# Patient Record
Sex: MALE | Race: WHITE | NOT HISPANIC OR LATINO | Employment: PART TIME | URBAN - METROPOLITAN AREA
[De-identification: names, ages, dates, MRNs, and addresses within clinical notes are randomized per-mention and may not be internally consistent; named-entity substitution may affect disease eponyms.]

---

## 2020-01-29 ENCOUNTER — OFFICE VISIT CONVERTED (OUTPATIENT)
Dept: GASTROENTEROLOGY | Facility: CLINIC | Age: 50
End: 2020-01-29
Attending: INTERNAL MEDICINE

## 2020-07-17 ENCOUNTER — HOSPITAL ENCOUNTER (OUTPATIENT)
Dept: PREADMISSION TESTING | Facility: HOSPITAL | Age: 50
Discharge: HOME | End: 2020-07-17
Attending: INTERNAL MEDICINE

## 2020-07-18 LAB — SARS-COV-2 RNA SPEC QL NAA+PROBE: NOT DETECTED

## 2020-07-23 ENCOUNTER — HOSPITAL ENCOUNTER (OUTPATIENT)
Dept: GASTROENTEROLOGY | Facility: HOSPITAL | Age: 50
Setting detail: HOSPITAL OUTPATIENT SURGERY
Discharge: HOME | End: 2020-07-23
Attending: INTERNAL MEDICINE

## 2020-07-23 LAB — GLUCOSE BLD-MCNC: 103 MG/DL (ref 70–99)

## 2020-09-01 ENCOUNTER — OFFICE VISIT CONVERTED (OUTPATIENT)
Dept: GASTROENTEROLOGY | Facility: CLINIC | Age: 50
End: 2020-09-01
Attending: NURSE PRACTITIONER

## 2021-05-15 VITALS
HEIGHT: 76 IN | HEART RATE: 112 BPM | WEIGHT: 315 LBS | DIASTOLIC BLOOD PRESSURE: 74 MMHG | BODY MASS INDEX: 38.36 KG/M2 | SYSTOLIC BLOOD PRESSURE: 120 MMHG

## 2021-06-13 ENCOUNTER — APPOINTMENT (OUTPATIENT)
Dept: CT IMAGING | Facility: HOSPITAL | Age: 51
End: 2021-06-13

## 2021-06-13 ENCOUNTER — HOSPITAL ENCOUNTER (EMERGENCY)
Facility: HOSPITAL | Age: 51
Discharge: HOME | End: 2021-06-13
Attending: EMERGENCY MEDICINE | Admitting: EMERGENCY MEDICINE

## 2021-06-13 VITALS
OXYGEN SATURATION: 96 % | WEIGHT: 315 LBS | SYSTOLIC BLOOD PRESSURE: 128 MMHG | DIASTOLIC BLOOD PRESSURE: 88 MMHG | RESPIRATION RATE: 18 BRPM | HEART RATE: 65 BPM | HEIGHT: 77 IN | BODY MASS INDEX: 37.19 KG/M2 | TEMPERATURE: 98.3 F

## 2021-06-13 DIAGNOSIS — R42 VERTIGO: ICD-10-CM

## 2021-06-13 DIAGNOSIS — H65.01 NON-RECURRENT ACUTE SEROUS OTITIS MEDIA OF RIGHT EAR: Primary | ICD-10-CM

## 2021-06-13 LAB
ALBUMIN SERPL-MCNC: 4.3 G/DL (ref 3.5–5.2)
ALBUMIN/GLOB SERPL: 1.6 G/DL
ALP SERPL-CCNC: 72 U/L (ref 39–117)
ALT SERPL W P-5'-P-CCNC: 41 U/L (ref 1–41)
ANION GAP SERPL CALCULATED.3IONS-SCNC: 10.4 MMOL/L (ref 5–15)
AST SERPL-CCNC: 28 U/L (ref 1–40)
BASOPHILS # BLD AUTO: 0.06 10*3/MM3 (ref 0–0.2)
BASOPHILS NFR BLD AUTO: 0.7 % (ref 0–1.5)
BILIRUB SERPL-MCNC: 0.4 MG/DL (ref 0–1.2)
BUN SERPL-MCNC: 14 MG/DL (ref 6–20)
BUN/CREAT SERPL: 15.7 (ref 7–25)
CALCIUM SPEC-SCNC: 9.3 MG/DL (ref 8.6–10.5)
CHLORIDE SERPL-SCNC: 105 MMOL/L (ref 98–107)
CO2 SERPL-SCNC: 23.6 MMOL/L (ref 22–29)
CREAT SERPL-MCNC: 0.89 MG/DL (ref 0.76–1.27)
DEPRECATED RDW RBC AUTO: 40 FL (ref 37–54)
EOSINOPHIL # BLD AUTO: 0.12 10*3/MM3 (ref 0–0.4)
EOSINOPHIL NFR BLD AUTO: 1.5 % (ref 0.3–6.2)
ERYTHROCYTE [DISTWIDTH] IN BLOOD BY AUTOMATED COUNT: 13.1 % (ref 12.3–15.4)
GFR SERPL CREATININE-BSD FRML MDRD: 90 ML/MIN/1.73
GLOBULIN UR ELPH-MCNC: 2.7 GM/DL
GLUCOSE SERPL-MCNC: 90 MG/DL (ref 65–99)
HCT VFR BLD AUTO: 41.7 % (ref 37.5–51)
HGB BLD-MCNC: 14.3 G/DL (ref 13–17.7)
IMM GRANULOCYTES # BLD AUTO: 0.03 10*3/MM3 (ref 0–0.05)
IMM GRANULOCYTES NFR BLD AUTO: 0.4 % (ref 0–0.5)
LYMPHOCYTES # BLD AUTO: 2.81 10*3/MM3 (ref 0.7–3.1)
LYMPHOCYTES NFR BLD AUTO: 34.6 % (ref 19.6–45.3)
MCH RBC QN AUTO: 29.2 PG (ref 26.6–33)
MCHC RBC AUTO-ENTMCNC: 34.3 G/DL (ref 31.5–35.7)
MCV RBC AUTO: 85.3 FL (ref 79–97)
MONOCYTES # BLD AUTO: 0.49 10*3/MM3 (ref 0.1–0.9)
MONOCYTES NFR BLD AUTO: 6 % (ref 5–12)
NEUTROPHILS NFR BLD AUTO: 4.6 10*3/MM3 (ref 1.7–7)
NEUTROPHILS NFR BLD AUTO: 56.8 % (ref 42.7–76)
NRBC BLD AUTO-RTO: 0 /100 WBC (ref 0–0.2)
PLATELET # BLD AUTO: 323 10*3/MM3 (ref 140–450)
PMV BLD AUTO: 9 FL (ref 6–12)
POTASSIUM SERPL-SCNC: 3.5 MMOL/L (ref 3.5–5.2)
PROT SERPL-MCNC: 7 G/DL (ref 6–8.5)
RBC # BLD AUTO: 4.89 10*6/MM3 (ref 4.14–5.8)
SODIUM SERPL-SCNC: 139 MMOL/L (ref 136–145)
WBC # BLD AUTO: 8.11 10*3/MM3 (ref 3.4–10.8)

## 2021-06-13 PROCEDURE — 80053 COMPREHEN METABOLIC PANEL: CPT | Performed by: EMERGENCY MEDICINE

## 2021-06-13 PROCEDURE — 99283 EMERGENCY DEPT VISIT LOW MDM: CPT

## 2021-06-13 PROCEDURE — 85025 COMPLETE CBC W/AUTO DIFF WBC: CPT | Performed by: EMERGENCY MEDICINE

## 2021-06-13 PROCEDURE — 70450 CT HEAD/BRAIN W/O DYE: CPT

## 2021-06-13 RX ORDER — TAMSULOSIN HYDROCHLORIDE 0.4 MG/1
1 CAPSULE ORAL DAILY
COMMUNITY

## 2021-06-13 RX ORDER — HYDROCHLOROTHIAZIDE 12.5 MG/1
1 TABLET ORAL DAILY
COMMUNITY

## 2021-06-13 RX ORDER — LISINOPRIL 20 MG/1
1 TABLET ORAL DAILY
COMMUNITY

## 2021-06-13 RX ORDER — ASPIRIN 81 MG/1
1 TABLET ORAL DAILY
COMMUNITY

## 2021-06-13 RX ORDER — MECLIZINE HYDROCHLORIDE 25 MG/1
25 TABLET ORAL 3 TIMES DAILY PRN
Qty: 15 TABLET | Refills: 0 | Status: SHIPPED | OUTPATIENT
Start: 2021-06-13

## 2021-06-13 RX ORDER — LEVOFLOXACIN 500 MG/1
500 TABLET, FILM COATED ORAL DAILY
Qty: 7 TABLET | Refills: 0 | Status: SHIPPED | OUTPATIENT
Start: 2021-06-13 | End: 2021-06-26

## 2021-06-13 RX ORDER — ATORVASTATIN CALCIUM 20 MG/1
20 TABLET, FILM COATED ORAL DAILY
COMMUNITY

## 2021-06-13 NOTE — ED PROVIDER NOTES
Subjective   Patient is a 51-yo male who presents to the ED with c/o upper respiratory infection that started 2 weeks ago. Pt went to the Urgent Care in [place] and was put on antibiotics which he has finished. Pt notes he had 3 near-syncopal episodes yesterday.Pt has had dizziness last night and again this morning. Pt symptoms are worse at night and consists of ear pain, sore throat, facial pain, and congestion.     PCP: Matthew Allen ([place])      History provided by:  Patient   used: No        Review of Systems   Constitutional: Negative for chills and fever.   HENT: Positive for congestion, ear pain and sore throat. Negative for nosebleeds.    Eyes: Negative for redness.   Respiratory: Negative for cough and shortness of breath.    Cardiovascular: Negative for chest pain.   Gastrointestinal: Negative for diarrhea and vomiting.   Genitourinary: Negative for dysuria and frequency.   Musculoskeletal: Negative for back pain and neck pain.   Skin: Negative for rash.   Neurological: Positive for dizziness. Negative for seizures.       Past Medical History:   Diagnosis Date   • Diabetes mellitus (CMS/HCC)    • Hypertension        Allergies   Allergen Reactions   • Morphine Shortness Of Breath   • Penicillins Hives       Past Surgical History:   Procedure Laterality Date   • APPENDECTOMY     • BACK SURGERY     • VASECTOMY         Family History   Problem Relation Age of Onset   • Diabetes Mother    • Hypertension Mother    • Hyperlipidemia Mother    • Diabetes Father    • Hypertension Father    • Hyperlipidemia Father    • Diabetes Brother    • Stroke Brother    • Hypertension Brother    • Hyperlipidemia Brother        Social History     Socioeconomic History   • Marital status:      Spouse name: Not on file   • Number of children: Not on file   • Years of education: Not on file   • Highest education level: Not on file   Tobacco Use   • Smoking status: Never Smoker   • Smokeless  tobacco: Current User   Substance and Sexual Activity   • Alcohol use: Never   • Drug use: Never         Objective   Physical Exam  Vitals and nursing note reviewed.   Constitutional:       General: He is not in acute distress.  HENT:      Head: Normocephalic and atraumatic.      Ears:      Comments: Serous fluid behind right TM.      Nose: Nose normal.      Mouth/Throat:      Mouth: Mucous membranes are moist.   Eyes:      General: No scleral icterus.     Pupils: Pupils are equal, round, and reactive to light.   Cardiovascular:      Rate and Rhythm: Normal rate and regular rhythm.      Heart sounds: Normal heart sounds. No murmur heard.     Pulmonary:      Effort: Pulmonary effort is normal. No respiratory distress.      Breath sounds: Normal breath sounds and air entry. No decreased air movement.   Abdominal:      Palpations: Abdomen is soft.      Tenderness: There is no abdominal tenderness. There is no guarding or rebound.      Hernia: No hernia is present.   Musculoskeletal:         General: No tenderness. Normal range of motion.      Cervical back: Normal range of motion and neck supple. No tenderness.      Right lower leg: No edema.      Left lower leg: No edema.   Skin:     General: Skin is warm and dry.   Neurological:      Mental Status: He is alert and oriented to person, place, and time. Mental status is at baseline.      Cranial Nerves: Cranial nerves are intact.      Sensory: Sensation is intact. No sensory deficit.      Motor: Motor function is intact. No weakness.   Psychiatric:         Behavior: Behavior normal.         Procedures         ED Course                                            MDM  Number of Diagnoses or Management Options  Non-recurrent acute serous otitis media of right ear  Vertigo  Diagnosis management comments: The patient is currently asymptomatic in the emergency department.  The patient historically had an episode of vertigo that is been associate with his right serous otitis.   We will change antibiotics and start him on meclizine as needed he is to follow with his primary care doctor.       Amount and/or Complexity of Data Reviewed  Clinical lab tests: reviewed  Tests in the radiology section of CPT®: reviewed        Final diagnoses:   Non-recurrent acute serous otitis media of right ear   Vertigo       Documentation assistance provided by emma Bang.  Information recorded by the scribe was done at my direction and has been verified and validated by me.     Kenyetta Bang  06/13/21 1446              Liam Lopez DO  06/13/21 8091

## 2021-06-13 NOTE — DISCHARGE INSTRUCTIONS
Drink plenty of fluids.  Use Flonase nasal spray as directed.  Take medications as directed.  Follow-up with your doctor in 2 days if not better.

## 2021-07-20 ENCOUNTER — TRANSCRIBE ORDERS (OUTPATIENT)
Dept: ADMINISTRATIVE | Facility: HOSPITAL | Age: 51
End: 2021-07-20

## 2021-07-20 ENCOUNTER — LAB (OUTPATIENT)
Dept: LAB | Facility: HOSPITAL | Age: 51
End: 2021-07-20

## 2021-07-20 DIAGNOSIS — E13.9 DIABETES 1.5, MANAGED AS TYPE 1 (HCC): ICD-10-CM

## 2021-07-20 DIAGNOSIS — E13.9 DIABETES 1.5, MANAGED AS TYPE 1 (HCC): Primary | ICD-10-CM

## 2021-07-20 LAB — HBA1C MFR BLD: 5.92 % (ref 4.8–5.6)

## 2021-07-20 PROCEDURE — 83036 HEMOGLOBIN GLYCOSYLATED A1C: CPT

## 2021-07-20 PROCEDURE — 36415 COLL VENOUS BLD VENIPUNCTURE: CPT

## 2021-11-04 ENCOUNTER — TRANSCRIBE ORDERS (OUTPATIENT)
Dept: ADMINISTRATIVE | Facility: HOSPITAL | Age: 51
End: 2021-11-04

## 2021-11-04 DIAGNOSIS — I49.9 VENTRICULAR ARRHYTHMIA: Primary | ICD-10-CM

## 2021-11-11 ENCOUNTER — HOSPITAL ENCOUNTER (OUTPATIENT)
Dept: CARDIOLOGY | Facility: HOSPITAL | Age: 51
Discharge: HOME | End: 2021-11-11
Admitting: FAMILY MEDICINE

## 2021-11-11 DIAGNOSIS — I49.9 VENTRICULAR ARRHYTHMIA: ICD-10-CM

## 2021-11-11 PROCEDURE — 93225 XTRNL ECG REC<48 HRS REC: CPT

## 2021-11-19 LAB
MAXIMAL PREDICTED HEART RATE: 169 BPM
STRESS TARGET HR: 144 BPM

## 2021-11-19 PROCEDURE — 93227 XTRNL ECG REC<48 HR R&I: CPT | Performed by: INTERNAL MEDICINE

## 2022-04-27 ENCOUNTER — HOSPITAL ENCOUNTER (OUTPATIENT)
Dept: GENERAL RADIOLOGY | Facility: HOSPITAL | Age: 52
Discharge: HOME | End: 2022-04-27
Admitting: FAMILY MEDICINE

## 2022-04-27 ENCOUNTER — TRANSCRIBE ORDERS (OUTPATIENT)
Dept: GENERAL RADIOLOGY | Facility: HOSPITAL | Age: 52
End: 2022-04-27

## 2022-04-27 DIAGNOSIS — M79.642 LEFT HAND PAIN: Primary | ICD-10-CM

## 2022-04-27 DIAGNOSIS — M79.642 LEFT HAND PAIN: ICD-10-CM

## 2022-04-27 PROCEDURE — 73130 X-RAY EXAM OF HAND: CPT

## 2022-07-27 ENCOUNTER — HOSPITAL ENCOUNTER (EMERGENCY)
Facility: HOSPITAL | Age: 52
Discharge: HOME | End: 2022-07-27
Attending: EMERGENCY MEDICINE | Admitting: EMERGENCY MEDICINE

## 2022-07-27 ENCOUNTER — APPOINTMENT (OUTPATIENT)
Dept: CT IMAGING | Facility: HOSPITAL | Age: 52
End: 2022-07-27

## 2022-07-27 VITALS
WEIGHT: 290.35 LBS | OXYGEN SATURATION: 93 % | BODY MASS INDEX: 34.28 KG/M2 | HEART RATE: 76 BPM | DIASTOLIC BLOOD PRESSURE: 55 MMHG | HEIGHT: 77 IN | RESPIRATION RATE: 19 BRPM | SYSTOLIC BLOOD PRESSURE: 95 MMHG | TEMPERATURE: 98.4 F

## 2022-07-27 DIAGNOSIS — R10.84 ACUTE GENERALIZED ABDOMINAL PAIN: ICD-10-CM

## 2022-07-27 DIAGNOSIS — R19.7 NAUSEA, VOMITING, AND DIARRHEA: ICD-10-CM

## 2022-07-27 DIAGNOSIS — E86.0 DEHYDRATION: ICD-10-CM

## 2022-07-27 DIAGNOSIS — E87.6 HYPOKALEMIA: ICD-10-CM

## 2022-07-27 DIAGNOSIS — R11.2 NAUSEA, VOMITING, AND DIARRHEA: ICD-10-CM

## 2022-07-27 DIAGNOSIS — A08.4 VIRAL GASTROENTERITIS: Primary | ICD-10-CM

## 2022-07-27 LAB
ALBUMIN SERPL-MCNC: 3.8 G/DL (ref 3.5–5.2)
ALBUMIN/GLOB SERPL: 1.1 G/DL
ALP SERPL-CCNC: 64 U/L (ref 39–117)
ALT SERPL W P-5'-P-CCNC: 20 U/L (ref 1–41)
AMORPH URATE CRY URNS QL MICRO: ABNORMAL /HPF
ANION GAP SERPL CALCULATED.3IONS-SCNC: 12.9 MMOL/L (ref 5–15)
AST SERPL-CCNC: 15 U/L (ref 1–40)
BACTERIA UR QL AUTO: ABNORMAL /HPF
BASOPHILS # BLD AUTO: 0.08 10*3/MM3 (ref 0–0.2)
BASOPHILS NFR BLD AUTO: 1.1 % (ref 0–1.5)
BILIRUB SERPL-MCNC: 0.5 MG/DL (ref 0–1.2)
BILIRUB UR QL STRIP: ABNORMAL
BUN SERPL-MCNC: 18 MG/DL (ref 6–20)
BUN/CREAT SERPL: 15.3 (ref 7–25)
CALCIUM SPEC-SCNC: 9.4 MG/DL (ref 8.6–10.5)
CHLORIDE SERPL-SCNC: 101 MMOL/L (ref 98–107)
CLARITY UR: CLEAR
CO2 SERPL-SCNC: 23.1 MMOL/L (ref 22–29)
COLOR UR: ABNORMAL
CREAT SERPL-MCNC: 1.18 MG/DL (ref 0.76–1.27)
D-LACTATE SERPL-SCNC: 1.9 MMOL/L (ref 0.5–2)
DEPRECATED RDW RBC AUTO: 39.9 FL (ref 37–54)
EGFRCR SERPLBLD CKD-EPI 2021: 74.2 ML/MIN/1.73
EOSINOPHIL # BLD AUTO: 0.04 10*3/MM3 (ref 0–0.4)
EOSINOPHIL NFR BLD AUTO: 0.5 % (ref 0.3–6.2)
ERYTHROCYTE [DISTWIDTH] IN BLOOD BY AUTOMATED COUNT: 12.7 % (ref 12.3–15.4)
GLOBULIN UR ELPH-MCNC: 3.5 GM/DL
GLUCOSE SERPL-MCNC: 146 MG/DL (ref 65–99)
GLUCOSE UR STRIP-MCNC: NEGATIVE MG/DL
GRAN CASTS URNS QL MICRO: ABNORMAL /LPF
HCT VFR BLD AUTO: 43.3 % (ref 37.5–51)
HGB BLD-MCNC: 14.7 G/DL (ref 13–17.7)
HGB UR QL STRIP.AUTO: NEGATIVE
HOLD SPECIMEN: NORMAL
HOLD SPECIMEN: NORMAL
HYALINE CASTS UR QL AUTO: ABNORMAL /LPF
IMM GRANULOCYTES # BLD AUTO: 0.03 10*3/MM3 (ref 0–0.05)
IMM GRANULOCYTES NFR BLD AUTO: 0.4 % (ref 0–0.5)
KETONES UR QL STRIP: ABNORMAL
LEUKOCYTE ESTERASE UR QL STRIP.AUTO: NEGATIVE
LIPASE SERPL-CCNC: 52 U/L (ref 13–60)
LYMPHOCYTES # BLD AUTO: 1.77 10*3/MM3 (ref 0.7–3.1)
LYMPHOCYTES NFR BLD AUTO: 24.1 % (ref 19.6–45.3)
MAGNESIUM SERPL-MCNC: 2 MG/DL (ref 1.6–2.6)
MCH RBC QN AUTO: 29.3 PG (ref 26.6–33)
MCHC RBC AUTO-ENTMCNC: 33.9 G/DL (ref 31.5–35.7)
MCV RBC AUTO: 86.3 FL (ref 79–97)
MONOCYTES # BLD AUTO: 0.93 10*3/MM3 (ref 0.1–0.9)
MONOCYTES NFR BLD AUTO: 12.7 % (ref 5–12)
NEUTROPHILS NFR BLD AUTO: 4.49 10*3/MM3 (ref 1.7–7)
NEUTROPHILS NFR BLD AUTO: 61.2 % (ref 42.7–76)
NITRITE UR QL STRIP: POSITIVE
NRBC BLD AUTO-RTO: 0 /100 WBC (ref 0–0.2)
PH UR STRIP.AUTO: 5.5 [PH] (ref 5–8)
PLATELET # BLD AUTO: 284 10*3/MM3 (ref 140–450)
PMV BLD AUTO: 9.3 FL (ref 6–12)
POTASSIUM SERPL-SCNC: 3.4 MMOL/L (ref 3.5–5.2)
PROT SERPL-MCNC: 7.3 G/DL (ref 6–8.5)
PROT UR QL STRIP: ABNORMAL
RBC # BLD AUTO: 5.02 10*6/MM3 (ref 4.14–5.8)
RBC # UR STRIP: ABNORMAL /HPF
REF LAB TEST METHOD: ABNORMAL
SODIUM SERPL-SCNC: 137 MMOL/L (ref 136–145)
SP GR UR STRIP: >=1.03 (ref 1–1.03)
SQUAMOUS #/AREA URNS HPF: ABNORMAL /HPF
UROBILINOGEN UR QL STRIP: ABNORMAL
WBC # UR STRIP: ABNORMAL /HPF
WBC NRBC COR # BLD: 7.34 10*3/MM3 (ref 3.4–10.8)
WHOLE BLOOD HOLD COAG: NORMAL
WHOLE BLOOD HOLD SPECIMEN: NORMAL

## 2022-07-27 PROCEDURE — 36415 COLL VENOUS BLD VENIPUNCTURE: CPT

## 2022-07-27 PROCEDURE — 83690 ASSAY OF LIPASE: CPT

## 2022-07-27 PROCEDURE — 25010000002 DICYCLOMINE PER 20 MG: Performed by: EMERGENCY MEDICINE

## 2022-07-27 PROCEDURE — 85025 COMPLETE CBC W/AUTO DIFF WBC: CPT

## 2022-07-27 PROCEDURE — 96375 TX/PRO/DX INJ NEW DRUG ADDON: CPT

## 2022-07-27 PROCEDURE — 96365 THER/PROPH/DIAG IV INF INIT: CPT

## 2022-07-27 PROCEDURE — 83605 ASSAY OF LACTIC ACID: CPT

## 2022-07-27 PROCEDURE — 80053 COMPREHEN METABOLIC PANEL: CPT

## 2022-07-27 PROCEDURE — 96372 THER/PROPH/DIAG INJ SC/IM: CPT

## 2022-07-27 PROCEDURE — 74176 CT ABD & PELVIS W/O CONTRAST: CPT

## 2022-07-27 PROCEDURE — 25010000002 ONDANSETRON PER 1 MG: Performed by: EMERGENCY MEDICINE

## 2022-07-27 PROCEDURE — 81001 URINALYSIS AUTO W/SCOPE: CPT | Performed by: EMERGENCY MEDICINE

## 2022-07-27 PROCEDURE — 99283 EMERGENCY DEPT VISIT LOW MDM: CPT

## 2022-07-27 PROCEDURE — 25010000002 CEFTRIAXONE PER 250 MG: Performed by: EMERGENCY MEDICINE

## 2022-07-27 PROCEDURE — 83735 ASSAY OF MAGNESIUM: CPT | Performed by: EMERGENCY MEDICINE

## 2022-07-27 RX ORDER — ONDANSETRON 2 MG/ML
4 INJECTION INTRAMUSCULAR; INTRAVENOUS ONCE
Status: COMPLETED | OUTPATIENT
Start: 2022-07-27 | End: 2022-07-27

## 2022-07-27 RX ORDER — DICYCLOMINE HYDROCHLORIDE 10 MG/ML
20 INJECTION INTRAMUSCULAR ONCE
Status: COMPLETED | OUTPATIENT
Start: 2022-07-27 | End: 2022-07-27

## 2022-07-27 RX ORDER — POTASSIUM CHLORIDE 750 MG/1
40 CAPSULE, EXTENDED RELEASE ORAL ONCE
Status: COMPLETED | OUTPATIENT
Start: 2022-07-27 | End: 2022-07-27

## 2022-07-27 RX ORDER — ONDANSETRON 4 MG/1
4 TABLET, ORALLY DISINTEGRATING ORAL EVERY 6 HOURS PRN
Qty: 12 TABLET | Refills: 0 | Status: SHIPPED | OUTPATIENT
Start: 2022-07-27

## 2022-07-27 RX ORDER — DIPHENOXYLATE HYDROCHLORIDE AND ATROPINE SULFATE 2.5; .025 MG/1; MG/1
1 TABLET ORAL 4 TIMES DAILY PRN
Qty: 20 TABLET | Refills: 0 | Status: SHIPPED | OUTPATIENT
Start: 2022-07-27

## 2022-07-27 RX ORDER — SODIUM CHLORIDE 0.9 % (FLUSH) 0.9 %
10 SYRINGE (ML) INJECTION AS NEEDED
Status: DISCONTINUED | OUTPATIENT
Start: 2022-07-27 | End: 2022-07-27 | Stop reason: HOSPADM

## 2022-07-27 RX ORDER — CEFDINIR 300 MG/1
300 CAPSULE ORAL 2 TIMES DAILY
Qty: 14 CAPSULE | Refills: 0 | Status: SHIPPED | OUTPATIENT
Start: 2022-07-27

## 2022-07-27 RX ORDER — DICYCLOMINE HCL 20 MG
20 TABLET ORAL EVERY 8 HOURS PRN
Qty: 20 TABLET | Refills: 0 | Status: SHIPPED | OUTPATIENT
Start: 2022-07-27

## 2022-07-27 RX ORDER — CEFTRIAXONE SODIUM 1 G/50ML
1 INJECTION, SOLUTION INTRAVENOUS ONCE
Status: COMPLETED | OUTPATIENT
Start: 2022-07-27 | End: 2022-07-27

## 2022-07-27 RX ADMIN — SODIUM CHLORIDE 1000 ML: 9 INJECTION, SOLUTION INTRAVENOUS at 15:35

## 2022-07-27 RX ADMIN — POTASSIUM CHLORIDE 40 MEQ: 750 CAPSULE, EXTENDED RELEASE ORAL at 14:48

## 2022-07-27 RX ADMIN — DICYCLOMINE HYDROCHLORIDE 20 MG: 20 INJECTION, SOLUTION INTRAMUSCULAR at 14:08

## 2022-07-27 RX ADMIN — SODIUM CHLORIDE 1000 ML: 9 INJECTION, SOLUTION INTRAVENOUS at 14:12

## 2022-07-27 RX ADMIN — CEFTRIAXONE SODIUM 1 G: 1 INJECTION, SOLUTION INTRAVENOUS at 16:17

## 2022-07-27 RX ADMIN — ONDANSETRON 4 MG: 2 INJECTION INTRAMUSCULAR; INTRAVENOUS at 14:12

## 2022-08-05 ENCOUNTER — OFFICE VISIT (OUTPATIENT)
Dept: ORTHOPEDIC SURGERY | Facility: CLINIC | Age: 52
End: 2022-08-05

## 2022-08-05 VITALS — HEIGHT: 77 IN | BODY MASS INDEX: 36.46 KG/M2 | WEIGHT: 308.8 LBS

## 2022-08-05 DIAGNOSIS — M25.512 LEFT SHOULDER PAIN, UNSPECIFIED CHRONICITY: Primary | ICD-10-CM

## 2022-08-05 DIAGNOSIS — S49.92XA INJURY OF LEFT SHOULDER, INITIAL ENCOUNTER: ICD-10-CM

## 2022-08-05 PROCEDURE — 99204 OFFICE O/P NEW MOD 45 MIN: CPT | Performed by: ORTHOPAEDIC SURGERY

## 2022-08-05 RX ORDER — NAPROXEN 500 MG/1
TABLET ORAL
COMMUNITY
Start: 2022-08-02

## 2022-08-05 RX ORDER — HYDROCODONE BITARTRATE AND ACETAMINOPHEN 5; 325 MG/1; MG/1
TABLET ORAL
COMMUNITY
Start: 2022-08-02

## 2022-08-05 RX ORDER — DICLOFENAC SODIUM 75 MG/1
75 TABLET, DELAYED RELEASE ORAL 2 TIMES DAILY
Qty: 60 TABLET | Refills: 0 | Status: SHIPPED | OUTPATIENT
Start: 2022-08-05

## 2022-08-05 NOTE — PROGRESS NOTES
"Chief Complaint  Initial Evaluation of the Left Shoulder     Subjective      Rogelio Damian presents to [facility] for an evaluation of left shoulder. Patient had a fall a week ago on a Friday resulting in pain of the left shoulder. He has pain with shoulder range of motion. He states anterior shoulder pain that radiates posteriorly and down the upper arm.     Allergies   Allergen Reactions   • Morphine Shortness Of Breath   • Penicillins Hives        Social History     Socioeconomic History   • Marital status:    Tobacco Use   • Smoking status: Never Smoker   • Smokeless tobacco: Never Used   Vaping Use   • Vaping Use: Never used   Substance and Sexual Activity   • Alcohol use: Never   • Drug use: Never   • Sexual activity: Defer        Review of Systems     Objective   Vital Signs:   Ht 195.6 cm (77\")   Wt (!) 140 kg (308 lb 12.8 oz)   BMI 36.62 kg/m²       Physical Exam  Constitutional:       Appearance: Normal appearance. Patient is well-developed and normal weight.   HENT:      Head: Normocephalic.      Right Ear: Hearing and external ear normal.      Left Ear: Hearing and external ear normal.      Nose: Nose normal.   Eyes:      Conjunctiva/sclera: Conjunctivae normal.   Cardiovascular:      Rate and Rhythm: Normal rate.   Pulmonary:      Effort: Pulmonary effort is normal.      Breath sounds: No wheezing or rales.   Abdominal:      Palpations: Abdomen is soft.      Tenderness: There is no abdominal tenderness.   Musculoskeletal:      Cervical back: Normal range of motion.   Skin:     Findings: No rash.   Neurological:      Mental Status: Patient  is alert and oriented to person, place, and time.   Psychiatric:         Mood and Affect: Mood and affect normal.         Judgment: Judgment normal.       Ortho Exam      LEFT SHOULDER: Tender AC joint. Tender trapezius. Tender subacromial bursa. Forward elevation to 150 degrees, passively to 170 degrees with pain. Pain with " empty can testing. Mild weakness with empty can testing. Radial pulse 2+, ulnar pulse 2+. No swelling, skin discoloration or atrophy.       Procedures        Imaging Results (Most Recent)     Procedure Component Value Units Date/Time    XR Scapula Left [[id number]] Resulted: 08/05/22 1411     Updated: 08/05/22 1417           Result Review :     X-Ray Report:  Left shoulder(s) X-Ray  Indication: Evaluation of left shoulder pain   AP and Lateral view(s)  Findings: No acute fractures or dislocation. Pre-existing ac joint arthritis.   Prior studies available for comparison: no        Assessment and Plan     Diagnoses and all orders for this visit:    1. Left shoulder pain, unspecified chronicity (Primary)  -     XR Scapula Left    2. Injury of left shoulder, initial encounter      Discussed treatment plans and diagnosis with the patient. Anti-inflammatory prescribed today. At home exercises provided for the patient. May consider mri or injection next visit if failing to improve.     Call or return if worsening symptoms.    Follow Up     4 weeks.       Patient was given instructions and counseling regarding his condition or for health maintenance advice. Please see specific information pulled into the AVS if appropriate.     Scribed for Juan Pablo Samuel MD by Le De Leon.  08/05/22   14:38 EDT    I have personally performed the services described in this document as scribed by the above individual and it is both accurate and complete. Juan Pablo Samuel MD 08/08/22

## 2022-10-11 ENCOUNTER — OFFICE VISIT (OUTPATIENT)
Dept: NEUROSURGERY | Facility: CLINIC | Age: 52
End: 2022-10-11

## 2022-10-11 VITALS
BODY MASS INDEX: 35.66 KG/M2 | DIASTOLIC BLOOD PRESSURE: 91 MMHG | WEIGHT: 302 LBS | HEART RATE: 50 BPM | SYSTOLIC BLOOD PRESSURE: 153 MMHG | HEIGHT: 77 IN

## 2022-10-11 DIAGNOSIS — M50.222 HERNIATED NUCLEUS PULPOSUS, C5-6 LEFT: Primary | ICD-10-CM

## 2022-10-11 DIAGNOSIS — M54.2 CERVICALGIA: ICD-10-CM

## 2022-10-11 DIAGNOSIS — M54.12 LEFT CERVICAL RADICULOPATHY: ICD-10-CM

## 2022-10-11 PROCEDURE — 99204 OFFICE O/P NEW MOD 45 MIN: CPT | Performed by: PHYSICIAN ASSISTANT

## 2022-10-11 RX ORDER — PREGABALIN 75 MG/1
CAPSULE ORAL
COMMUNITY
Start: 2022-10-07

## 2022-10-11 NOTE — PROGRESS NOTES
"Chief Complaint  Neck Pain, Arm Pain (Left ), Numbness (Left arm to fingers ), and Tingling (Left arm to fingers )    Subjective          Rogelio Damian who is a 52 y.o. year old male who presents to [facility] for Evaluation of the Spine.     The patient complains of pain located in the Cervical Spine.  Patients states the pain has been present for 2 months.  The pain came on acutely.  The pain scaled level is 6.  The pain does radiate. Dermatomes are located on left Cervical at: a non-specific dermatome and to the elbow..  The pain is Intermittent and described as aching.  The pain is worse at no particular time of day. Patient states laying flat on his back and taking Lyrica makes the pain better.  Patient states using the left arm, laying on the left side makes the pain worse.    Associated Symptoms Include: Numbness to the left thumb. He does report subjective weakness in the left arm.  Conservative Interventions Include: Pain Medications that were somewhat effective., NSAIDs that were somewhat effective. and Lyrica which is somewhat effective.    Was this the result of an injury or accident?: Yes, Fall several months ago. He was dumping out a cattle trough and slipped on the hillside, fell on the ground and rolled down the hillside.    History of Previous Spinal Surgery?: Yes.  Lumbar, Date 2010, L3-S1.     reports that he has never smoked. He has never used smokeless tobacco.    Review of Systems   Musculoskeletal: Positive for neck pain and neck stiffness.   Neurological: Positive for numbness.        Objective   Vital Signs:   /91   Pulse 50   Ht 195.6 cm (77\")   Wt (!) 137 kg (302 lb)   BMI 35.81 kg/m²       Physical Exam  Constitutional:       Appearance: Normal appearance. He is obese.   Neck:      Comments: Pain with ROM  Pulmonary:      Effort: Pulmonary effort is normal.   Musculoskeletal:         General: Tenderness (midline cervical spine, left " cervical paraspinals, trapezius and rhomboid) present.      Comments: Canela's negative bilaterally,  Tinel's testing positive at the left wrist and elbow,  Pain in the left side of the neck with left shoulder abduction and internal and external rotation.   Neurological:      General: No focal deficit present.      Mental Status: He is alert and oriented to person, place, and time.      Sensory: No sensory deficit.      Motor: Weakness (left hand , left shoulder abduction) present.      Deep Tendon Reflexes: Reflexes normal.   Psychiatric:         Mood and Affect: Mood normal.         Behavior: Behavior normal.        Neurologic Exam     Mental Status   Oriented to person, place, and time.        Result Review     I have personally reviewed the MRI of cervical spine without contrast from 9/19/2022 which shows a large disc extrusion to the left at C5-C6 with severe left foraminal stenosis.  There is moderate to severe right foraminal stenosis at C4-C5.     Assessment and Plan    Diagnoses and all orders for this visit:    1. Herniated nucleus pulposus, C5-6 left (Primary)  -     Ambulatory Referral to Pain Management    2. Cervicalgia  -     Ambulatory Referral to Pain Management    3. Left cervical radiculopathy  -     Ambulatory Referral to Pain Management    His pain goes down the left arm to the elbow. The numbness goes to the thumb of the left hand.    He does have a left disc extrusion at C5-C6 with severe foraminal stenosis on the left. He may benefit from an ACDF at this level, but I would recommend a more conservative treatment on the basis of his pain not extending all the way to the fingers. He would like to avoid surgery if possible.    I do expect he could benefit from physical therapy. He is deferring for now.    He may benefit from a trial of CESB. I will refer him to [facility] in [place] to consult.    The patient was counseled on basic recommendations for the reduction and prevention of back,  neck, or spine pain in association with spinal disorders, including: cessation/avoidance of nicotine use, maintenance of a healthy BMI and weight, focusing on building/maintaining core strength through core exercise, and avoidance of activities which worsen the pain. The patient will monitor for changes in symptoms and notify our clinic of these changes as needed.    He will follow-up here PRN.    Follow Up   Return if symptoms worsen or fail to improve.  Patient was given instructions and counseling regarding his condition or for health maintenance advice. Please see specific information pulled into the AVS if appropriate.

## 2023-06-12 ENCOUNTER — TELEPHONE (OUTPATIENT)
Dept: ORTHOPEDIC SURGERY | Facility: CLINIC | Age: 53
End: 2023-06-12
Payer: MEDICARE

## 2023-06-12 NOTE — TELEPHONE ENCOUNTER
PATIENT WENT TO  IN [place], [place] FOR A BROKEN FOOT. I HAVE REQ MEDICAL RECORDS FROM THAT FACILITY FOR SCHEDULING.

## 2023-06-13 ENCOUNTER — TELEPHONE (OUTPATIENT)
Dept: ORTHOPEDIC SURGERY | Facility: CLINIC | Age: 53
End: 2023-06-13

## 2023-06-13 ENCOUNTER — TELEPHONE (OUTPATIENT)
Dept: ORTHOPEDIC SURGERY | Facility: CLINIC | Age: 53
End: 2023-06-13
Payer: MEDICARE

## 2023-06-13 NOTE — TELEPHONE ENCOUNTER
PATIENT WENT TO  IN [place], [place] OVER THE WEEKEND FOR A BROKEN FOOT. I HAVE REQ MEDICAL RECORDS FROM THAT FACILITY FOR SCHEDULING. HAVEN'T GOTTEN ANYTHING BACK AS OF YET.    PATIENT WILL BE DROPPING OF X-RAYS DISC TO [facility] FOR SCHEDULING.

## 2023-06-13 NOTE — TELEPHONE ENCOUNTER
Caller: EBER SERRATO /AYO     Relationship to patient: PATIENT/WIFE    Best call back number: [phone]    Chief complaint: FX FOOT    Type of visit: NEW PATIENT     Requested date: URGENT     If rescheduling, when is the original appointment: N/A     Additional notes:PATIENT WILL DROP XR FROM [facility] AT PRACTICE 06/14/23 IN THE AM INDICATING HE FX FOOT ALMOST A WEEK AGO.  AYO WILL ALSO CALL [facility] AND REQUEST OFFICE NOTES, IMAGING NOTES BE FAXED TO PRACTICE

## 2023-09-18 ENCOUNTER — PREP FOR SURGERY (OUTPATIENT)
Dept: OTHER | Facility: HOSPITAL | Age: 53
End: 2023-09-18
Payer: MEDICARE

## 2023-09-18 ENCOUNTER — CLINICAL SUPPORT (OUTPATIENT)
Dept: GASTROENTEROLOGY | Facility: CLINIC | Age: 53
End: 2023-09-18

## 2023-09-18 DIAGNOSIS — Z86.010 HISTORY OF COLON POLYPS: Primary | ICD-10-CM

## 2023-09-18 RX ORDER — NEBIVOLOL 10 MG/1
TABLET ORAL
COMMUNITY
Start: 2023-08-17

## 2023-09-18 RX ORDER — DUTASTERIDE 0.5 MG/1
CAPSULE, LIQUID FILLED ORAL
COMMUNITY
Start: 2023-08-17

## 2023-09-18 RX ORDER — FLUTICASONE PROPIONATE 50 MCG
SPRAY, SUSPENSION (ML) NASAL
COMMUNITY
Start: 2023-05-22

## 2023-09-18 RX ORDER — LEVOCETIRIZINE DIHYDROCHLORIDE 5 MG/1
TABLET, FILM COATED ORAL
COMMUNITY
Start: 2023-08-17

## 2023-09-18 NOTE — PROGRESS NOTES
Rogelio Damian  1970  53 y.o.    Reason for call: Recall    Prep prescribed: Mango  Prep instructions reviewed with patient and sent to patient via regular mail to the home address on file    Clearance needed? none    Is the patient currently on any injectable medications for weight loss or diabetes? No      Family history of colon cancer? No  If yes, indicate relative:     The patient has been scheduled for: Colonoscopy  Procedure Date: 12.6.23    Family History   Problem Relation Age of Onset    Diabetes Mother     Hypertension Mother     Hyperlipidemia Mother     Diabetes Father     Hypertension Father     Hyperlipidemia Father     Diabetes Brother     Stroke Brother     Hypertension Brother     Hyperlipidemia Brother     Cancer Other     Heart disease Other     Colon cancer Neg Hx      Past Medical History:   Diagnosis Date    Colon polyp     Diabetes mellitus     Hypertension      Allergies   Allergen Reactions    Morphine Shortness Of Breath    Penicillins Hives     Past Surgical History:   Procedure Laterality Date    APPENDECTOMY      BACK SURGERY      COLONOSCOPY  07/2020    VASECTOMY       Social History     Socioeconomic History    Marital status:    Tobacco Use    Smoking status: Never    Smokeless tobacco: Never   Vaping Use    Vaping Use: Never used   Substance and Sexual Activity    Alcohol use: Never    Drug use: Never    Sexual activity: Defer       Current Outpatient Medications:     aspirin 81 MG EC tablet, Take 1 tablet by mouth Daily., Disp: , Rfl:     atorvastatin (LIPITOR) 20 MG tablet, Take 1 tablet by mouth Daily., Disp: , Rfl:     diclofenac (VOLTAREN) 75 MG EC tablet, Take 1 tablet by mouth 2 (Two) Times a Day., Disp: 60 tablet, Rfl: 0    dutasteride (AVODART) 0.5 MG capsule, , Disp: , Rfl:     fluticasone (FLONASE) 50 MCG/ACT nasal spray, , Disp: , Rfl:     hydroCHLOROthiazide (HYDRODIURIL) 12.5 MG tablet, Take 1 tablet by mouth Daily., Disp: , Rfl:     levocetirizine  (XYZAL) 5 MG tablet, , Disp: , Rfl:     lisinopril (PRINIVIL,ZESTRIL) 20 MG tablet, Take 1 tablet by mouth Daily., Disp: , Rfl:     metFORMIN (GLUCOPHAGE) 500 MG tablet, Take 1 tablet by mouth 2 (two) times a day., Disp: , Rfl:     nebivolol (BYSTOLIC) 10 MG tablet, , Disp: , Rfl:     pregabalin (LYRICA) 75 MG capsule, , Disp: , Rfl:     tamsulosin (FLOMAX) 0.4 MG capsule 24 hr capsule, Take 1 capsule by mouth Daily., Disp: , Rfl:     cefdinir (OMNICEF) 300 MG capsule, Take 1 capsule by mouth 2 (Two) Times a Day., Disp: 14 capsule, Rfl: 0    dicyclomine (BENTYL) 20 MG tablet, Take 1 tablet by mouth Every 8 (Eight) Hours As Needed (Abdominal crampy pains)., Disp: 20 tablet, Rfl: 0    diphenoxylate-atropine (LOMOTIL) 2.5-0.025 MG per tablet, Take 1 tablet by mouth 4 (Four) Times a Day As Needed for Diarrhea., Disp: 20 tablet, Rfl: 0    HYDROcodone-acetaminophen (NORCO) 5-325 MG per tablet, , Disp: , Rfl:     meclizine (ANTIVERT) 25 MG tablet, Take 1 tablet by mouth 3 (Three) Times a Day As Needed for Dizziness., Disp: 15 tablet, Rfl: 0    naproxen (NAPROSYN) 500 MG tablet, , Disp: , Rfl:     ondansetron ODT (ZOFRAN-ODT) 4 MG disintegrating tablet, Place 1 tablet on the tongue Every 6 (Six) Hours As Needed for Nausea or Vomiting., Disp: 12 tablet, Rfl: 0

## 2023-09-19 PROBLEM — Z86.0100 HISTORY OF COLON POLYPS: Status: ACTIVE | Noted: 2023-09-19

## 2023-09-19 PROBLEM — Z86.010 HISTORY OF COLON POLYPS: Status: ACTIVE | Noted: 2023-09-19

## 2023-11-28 NOTE — PRE-PROCEDURE INSTRUCTIONS
"Instructed on date and arrival time of 0630. Come to entrance \"C\". Must have  over age [address] to drive home.  May have two visitors; however, children under 12 must stay in waiting room.  Discussed clear liquid diet (no red or purple), bowel prep, and NPO.  May take medications as usual except for blood thinners, diabetic medications, and weight loss medications.  Bring list of medications.  Verbalized understanding of instructions given.  Instructed to call for questions or concerns.    "

## 2023-11-30 ENCOUNTER — TELEPHONE (OUTPATIENT)
Dept: GASTROENTEROLOGY | Facility: CLINIC | Age: 53
End: 2023-11-30
Payer: MEDICARE

## 2023-11-30 NOTE — TELEPHONE ENCOUNTER
Caller: Rogelio Damian    Relationship to patient: Self    Best call back number: [phone]     Chief complaint: FAMILY EVENT SCHEDULING CONFLICT    Type of visit: PROCEDURE    Requested date: NEXT AVAILABLE WEDNESDAY     If rescheduling, when is the original appointment: 12/6/23

## 2023-11-30 NOTE — TELEPHONE ENCOUNTER
Rogelio Damian  1970    Patient requested to Reschedule their Colonoscopy. I have offered to reschedule this patient and patient has agreed. Patient has been rescheduled to 1.31.24 @ 7:00 am.    Reason for cancelling/rescheduling: Patient has court this day.     This procedure was ordered by Jose Miguel Silvestre MD for an important reason. We want to inform you that there are risks associated with not proceeding with the procedure at this time such as a delay in diagnosis, risk of incurable disease, or cancer.    Updated clearance needed?: No    Is the patient currently on any injectable medications for weight loss or diabetes? No    If yes, are they aware that they will need to discontinue this 7 days prior to their procedure? No    Patient verbalized understanding for all of the above information.

## 2024-01-22 NOTE — PRE-PROCEDURE INSTRUCTIONS
Arrival time of . 0700    Must have a  over 18 years old for transportation post prodecure, as well as photo ID and insurance card.    Education provided on laxative administration; bowel prep to be taken in two doses. Reviewed diet instructions for day prior to procedure. Only plain, unflavored water after midnight until two hours prior to arrival time.    Take any morning medications of the day and the procedure as well as the night before. Bring all prescribed medications or a list of medications and inhalers to the hospital on the morning of the procedure.    Patient verbalized understanding of instructions

## 2024-01-29 ENCOUNTER — TELEPHONE (OUTPATIENT)
Dept: GASTROENTEROLOGY | Facility: CLINIC | Age: 54
End: 2024-01-29
Payer: MEDICARE

## 2024-01-29 NOTE — TELEPHONE ENCOUNTER
Rogelio Damian  1970    Patient requested to Reschedule their Colonoscopy. I have offered to reschedule this patient and patient has agreed. Patient has been rescheduled to 3.26.24.    Reason for cancelling/rescheduling: pt is sick and on antibiotic      This procedure was ordered by Jose Miguel Silvestre MD for an important reason. We want to inform you that there are risks associated with not proceeding with the procedure at this time such as a delay in diagnosis, risk of incurable disease, or cancer.      Updated clearance needed?: No    Is the patient currently on any injectable medications for weight loss or diabetes.No        Patient verbalized understanding for all of the above information.

## 2024-03-20 NOTE — PRE-PROCEDURE INSTRUCTIONS
"Instructed on date and arrival time of 0900. Come to entrance \"C\". Must have  over age 18 to drive home.  May have two visitors; however, children under 12 must stay in waiting room.  Discussed clear liquid diet (no red or purple), bowel prep, and NPO.  May take medications as usual except for blood thinners, diabetic medications, and weight loss medications.  Bring list of medications.  Verbalized understanding of instructions given.  Instructed to call for questions or concerns.  "

## 2024-03-25 ENCOUNTER — ANESTHESIA EVENT (OUTPATIENT)
Dept: GASTROENTEROLOGY | Facility: HOSPITAL | Age: 54
End: 2024-03-25
Payer: MEDICARE

## 2024-03-25 NOTE — ANESTHESIA PREPROCEDURE EVALUATION
Anesthesia Evaluation     Patient summary reviewed   NPO Solid Status: > 8 hours  NPO Liquid Status: > 2 hours           Airway   Mallampati: III  TM distance: >3 FB  Neck ROM: full  Possible difficult intubation and Large neck circumference  Comment: Patient states that he had to be intubated for his last Colonoscopy. He thinks it was related to his sleep apnea   Dental - normal exam     Pulmonary - normal exam    breath sounds clear to auscultation  (+) ,sleep apnea    ROS comment: Does not wear Cpap  Cardiovascular - normal exam    (+) hypertension, dysrhythmias Bradycardia    ROS comment: No meds today     Bradycardia and occassional palpitations     Neuro/Psych  GI/Hepatic/Renal/Endo    (+) morbid obesity, diabetes mellitus type 2    ROS Comment: Patient only has 1 kidney but it functions well.     Musculoskeletal     Abdominal   (+) obese   Substance History      OB/GYN          Other                      Anesthesia Plan    ASA 3     general     (Total IV Anesthesia    Patient understands anesthesia not responsible for dental damage.  )  intravenous induction     Anesthetic plan, risks, benefits, and alternatives have been provided, discussed and informed consent has been obtained with: patient.    Plan discussed with CRNA.    CODE STATUS:

## 2024-03-26 ENCOUNTER — HOSPITAL ENCOUNTER (OUTPATIENT)
Facility: HOSPITAL | Age: 54
Setting detail: HOSPITAL OUTPATIENT SURGERY
Discharge: HOME | End: 2024-03-26
Attending: INTERNAL MEDICINE | Admitting: INTERNAL MEDICINE
Payer: MEDICARE

## 2024-03-26 ENCOUNTER — ANESTHESIA (OUTPATIENT)
Dept: GASTROENTEROLOGY | Facility: HOSPITAL | Age: 54
End: 2024-03-26
Payer: MEDICARE

## 2024-03-26 VITALS
TEMPERATURE: 97.1 F | RESPIRATION RATE: 21 BRPM | HEIGHT: 76 IN | HEART RATE: 65 BPM | WEIGHT: 315 LBS | DIASTOLIC BLOOD PRESSURE: 81 MMHG | SYSTOLIC BLOOD PRESSURE: 132 MMHG | BODY MASS INDEX: 38.36 KG/M2 | OXYGEN SATURATION: 94 %

## 2024-03-26 DIAGNOSIS — Z86.010 HISTORY OF COLON POLYPS: ICD-10-CM

## 2024-03-26 LAB — GLUCOSE BLDC GLUCOMTR-MCNC: 106 MG/DL (ref 70–99)

## 2024-03-26 PROCEDURE — 25810000003 LACTATED RINGERS PER 1000 ML

## 2024-03-26 PROCEDURE — 82948 REAGENT STRIP/BLOOD GLUCOSE: CPT

## 2024-03-26 PROCEDURE — 88305 TISSUE EXAM BY PATHOLOGIST: CPT | Performed by: INTERNAL MEDICINE

## 2024-03-26 PROCEDURE — 25010000002 PROPOFOL 10 MG/ML EMULSION: Performed by: NURSE ANESTHETIST, CERTIFIED REGISTERED

## 2024-03-26 RX ORDER — PROPOFOL 10 MG/ML
VIAL (ML) INTRAVENOUS AS NEEDED
Status: DISCONTINUED | OUTPATIENT
Start: 2024-03-26 | End: 2024-03-26 | Stop reason: SURG

## 2024-03-26 RX ORDER — SODIUM CHLORIDE, SODIUM LACTATE, POTASSIUM CHLORIDE, CALCIUM CHLORIDE 600; 310; 30; 20 MG/100ML; MG/100ML; MG/100ML; MG/100ML
30 INJECTION, SOLUTION INTRAVENOUS CONTINUOUS
Status: DISCONTINUED | OUTPATIENT
Start: 2024-03-26 | End: 2024-03-26 | Stop reason: HOSPADM

## 2024-03-26 RX ADMIN — PROPOFOL 250 MCG/KG/MIN: 10 INJECTION, EMULSION INTRAVENOUS at 10:06

## 2024-03-26 RX ADMIN — SODIUM CHLORIDE, POTASSIUM CHLORIDE, SODIUM LACTATE AND CALCIUM CHLORIDE 30 ML/HR: 600; 310; 30; 20 INJECTION, SOLUTION INTRAVENOUS at 09:43

## 2024-03-26 RX ADMIN — LIDOCAINE HYDROCHLORIDE 50 MG: 20 INJECTION, SOLUTION INTRAVENOUS at 10:06

## 2024-03-26 RX ADMIN — PROPOFOL 100 MG: 10 INJECTION, EMULSION INTRAVENOUS at 10:12

## 2024-03-26 RX ADMIN — PROPOFOL 100 MG: 10 INJECTION, EMULSION INTRAVENOUS at 10:06

## 2024-03-26 NOTE — ANESTHESIA POSTPROCEDURE EVALUATION
Patient: Rogelio Damian    Procedure Summary       Date: 03/26/24 Room / Location: [facility] ENDOSCOPY 1 / [facility] END[facility]COPY    Anesthesia Start: 0959 Anesthesia Stop: 1037    Procedure: COLONOSCOPY WITH BIOPSIES Diagnosis:       History of colon polyps      (History of colon polyps [Z86.010])    Surgeons: Jesus Silvestre MD Provider: Brook Mitchell CRNA    Anesthesia Type: general ASA Status: 3            Anesthesia Type: general    Vitals  Vitals Value Taken Time   /81 03/26/24 1101   Temp 36.2 °C (97.1 °F) 03/26/24 1100   Pulse 65 03/26/24 1102   Resp 21 03/26/24 1100   SpO2 92 % 03/26/24 1102   Vitals shown include unfiled device data.        Post Anesthesia Care and Evaluation    Patient location during evaluation: bedside  Patient participation: complete - patient participated  Level of consciousness: awake  Pain management: adequate    Airway patency: patent  Anesthetic complications: No anesthetic complications  PONV Status: controlled  Cardiovascular status: acceptable and stable  Respiratory status: acceptable

## 2024-03-26 NOTE — H&P
Pre Procedure History & Physical    Chief Complaint:   Rectal bleeding    Subjective     HPI:   Rectal bleeding    Past Medical History:   Past Medical History:   Diagnosis Date    Colon polyp     Diabetes mellitus     Hypertension     Sleep apnea        Past Surgical History:  Past Surgical History:   Procedure Laterality Date    APPENDECTOMY      BACK SURGERY      COLONOSCOPY  07/2020    KNEE ARTHROSCOPY W/ MENISCECTOMY Right     VASECTOMY         Family History:  Family History   Problem Relation Age of Onset    Diabetes Mother     Hypertension Mother     Hyperlipidemia Mother     Diabetes Father     Hypertension Father     Hyperlipidemia Father     Diabetes Brother     Stroke Brother     Hypertension Brother     Hyperlipidemia Brother     Cancer Other     Heart disease Other     Colon cancer Neg Hx        Social History:   reports that he has never smoked. He has never used smokeless tobacco. He reports that he does not drink alcohol and does not use drugs.    Medications:   Medications Prior to Admission   Medication Sig Dispense Refill Last Dose    aspirin 81 MG EC tablet Take 1 tablet by mouth Daily.   3/25/2024    atorvastatin (LIPITOR) 20 MG tablet Take 1 tablet by mouth Daily.   3/25/2024    diclofenac (VOLTAREN) 75 MG EC tablet Take 1 tablet by mouth 2 (Two) Times a Day. 60 tablet 0 3/25/2024    dicyclomine (BENTYL) 20 MG tablet Take 1 tablet by mouth Every 8 (Eight) Hours As Needed (Abdominal crampy pains). 20 tablet 0 3/25/2024    dutasteride (AVODART) 0.5 MG capsule    3/25/2024    fluticasone (FLONASE) 50 MCG/ACT nasal spray    Past Month    hydroCHLOROthiazide (HYDRODIURIL) 12.5 MG tablet Take 1 tablet by mouth Daily.   3/25/2024    levocetirizine (XYZAL) 5 MG tablet    3/25/2024    lisinopril (PRINIVIL,ZESTRIL) 20 MG tablet Take 1 tablet by mouth Daily.   3/25/2024    metFORMIN (GLUCOPHAGE) 500 MG tablet Take 1 tablet by mouth 2 (two) times a day.   3/25/2024    naproxen (NAPROSYN) 500 MG tablet     "3/25/2024    nebivolol (BYSTOLIC) 10 MG tablet    3/25/2024    pregabalin (LYRICA) 75 MG capsule    3/25/2024    tamsulosin (FLOMAX) 0.4 MG capsule 24 hr capsule Take 1 capsule by mouth Daily.   3/25/2024    diphenoxylate-atropine (LOMOTIL) 2.5-0.025 MG per tablet Take 1 tablet by mouth 4 (Four) Times a Day As Needed for Diarrhea. 20 tablet 0 More than a month    meclizine (ANTIVERT) 25 MG tablet Take 1 tablet by mouth 3 (Three) Times a Day As Needed for Dizziness. 15 tablet 0 More than a month    ondansetron ODT (ZOFRAN-ODT) 4 MG disintegrating tablet Place 1 tablet on the tongue Every 6 (Six) Hours As Needed for Nausea or Vomiting. 12 tablet 0 More than a month       Allergies:  Morphine and Penicillins        Objective     Blood pressure 169/94, pulse 54, temperature 97.1 °F (36.2 °C), temperature source Temporal, resp. rate 16, height 193 cm (76\"), weight (!) 152 kg (335 lb 1.6 oz), SpO2 96%.    Physical Exam   Constitutional: Pt is oriented to person, place, and time and well-developed, well-nourished, and in no distress.   Mouth/Throat: Oropharynx is clear and moist.   Neck: Normal range of motion.   Cardiovascular: Normal rate, regular rhythm and normal heart sounds.    Pulmonary/Chest: Effort normal and breath sounds normal.   Abdominal: Soft. Nontender  Skin: Skin is warm and dry.   Psychiatric: Mood, memory, affect and judgment normal.     Assessment & Plan     Diagnosis:  Rectal bleeding    Anticipated Surgical Procedure:  Colonoscopy    The risks, benefits, and alternatives of this procedure have been discussed with the patient or the responsible party- the patient understands and agrees to proceed.            "

## 2024-03-27 LAB
CYTO UR: NORMAL
LAB AP CASE REPORT: NORMAL
LAB AP CLINICAL INFORMATION: NORMAL
PATH REPORT.FINAL DX SPEC: NORMAL
PATH REPORT.GROSS SPEC: NORMAL

## 2024-04-03 ENCOUNTER — PREP FOR SURGERY (OUTPATIENT)
Dept: OTHER | Facility: HOSPITAL | Age: 54
End: 2024-04-03
Payer: MEDICARE

## 2024-04-03 ENCOUNTER — TELEPHONE (OUTPATIENT)
Dept: GASTROENTEROLOGY | Facility: CLINIC | Age: 54
End: 2024-04-03
Payer: MEDICARE

## 2024-04-03 DIAGNOSIS — K63.5 HYPERPLASTIC COLONIC POLYP, UNSPECIFIED PART OF COLON: Primary | ICD-10-CM

## 2024-04-03 NOTE — TELEPHONE ENCOUNTER
Spoke to patient and informed of JOSIAS Santizo result note and recommendations.  Verified patient understanding.     Patient agreeable to repeat scope, however would like to do this in June.      CR entered for second sign.  No clearances needed.

## 2024-04-03 NOTE — TELEPHONE ENCOUNTER
----- Message from JOSIAS Armstrong sent at 3/27/2024  4:41 PM EDT -----  Colonoscopy 3/6/2024: Good prep, diverticula found in the sigmoid, internal hemorrhoids found, 10 mm polyp versus edematous mucosa found in the distal rectum, the polyp was flat-path shows hyperplastic polyp    R/w Dr Silvestre, set up flex sig next available for polyp removal

## 2024-04-04 PROBLEM — K63.5 HYPERPLASTIC COLONIC POLYP: Status: ACTIVE | Noted: 2024-04-03

## 2024-04-04 NOTE — TELEPHONE ENCOUNTER
Spoke with patient and informed of scheduled procedure.  Educated on bowel prep and to  prescribed bowel prep from pharmacy now.  Advised to expect a call from Endoscopy about a week prior to verify arrival time and review instructions.  Asked patient to reach out to the office as soon as possible if there is a need to reschedule or cancel procedure.  This will enable us to reschedule in a timely manner and fill that slot with someone who may be waiting for a procedure date.  Patient verbalized understanding.    Flex Sigmoidoscopy scheduled for Tuesday, 06.11.24 at 1030.    Mailed detailed instructions per patient request.

## 2024-05-18 ENCOUNTER — HOSPITAL ENCOUNTER (EMERGENCY)
Facility: HOSPITAL | Age: 54
Discharge: HOME | End: 2024-05-18
Attending: EMERGENCY MEDICINE
Payer: MEDICARE

## 2024-05-18 VITALS
SYSTOLIC BLOOD PRESSURE: 148 MMHG | TEMPERATURE: 98.4 F | RESPIRATION RATE: 16 BRPM | BODY MASS INDEX: 37.19 KG/M2 | DIASTOLIC BLOOD PRESSURE: 94 MMHG | WEIGHT: 315 LBS | OXYGEN SATURATION: 94 % | HEIGHT: 77 IN | HEART RATE: 76 BPM

## 2024-05-18 DIAGNOSIS — S01.01XA SCALP LACERATION, INITIAL ENCOUNTER: Primary | ICD-10-CM

## 2024-05-18 PROCEDURE — 90715 TDAP VACCINE 7 YRS/> IM: CPT

## 2024-05-18 PROCEDURE — 25010000002 TETANUS-DIPHTH-ACELL PERTUSSIS 5-2.5-18.5 LF-MCG/0.5 SUSPENSION PREFILLED SYRINGE

## 2024-05-18 PROCEDURE — 90471 IMMUNIZATION ADMIN: CPT

## 2024-05-18 PROCEDURE — 99282 EMERGENCY DEPT VISIT SF MDM: CPT

## 2024-05-18 RX ORDER — GINSENG 100 MG
1 CAPSULE ORAL ONCE
Status: COMPLETED | OUTPATIENT
Start: 2024-05-18 | End: 2024-05-18

## 2024-05-18 RX ADMIN — TETANUS TOXOID, REDUCED DIPHTHERIA TOXOID AND ACELLULAR PERTUSSIS VACCINE, ADSORBED 0.5 ML: 5; 2.5; 8; 8; 2.5 SUSPENSION INTRAMUSCULAR at 21:37

## 2024-05-18 RX ADMIN — BACITRACIN 0.9 G: 500 OINTMENT TOPICAL at 22:39

## 2024-05-19 NOTE — ED PROVIDER NOTES
Time: 8:46 PM EDT  Date of encounter:  5/18/2024  Independent Historian/Clinical History and Information was obtained by:   Patient    History is limited by: N/A    Chief Complaint   Patient presents with    Head Laceration         History of Present Illness:  Patient is a 54 y.o. year old male who presents to the emergency department for evaluation of laceration.  Patient states that he was weeding today when he accidentally hit the top of his head on the overhang of a metal building.  Tetanus is not up-to-date.  Patient not have any syncope and is not having any dizziness or vomiting.  (Provider in triage, Trell Mckeon PA-C)    Takes a daily aspirin.  Bleeding controlled.    Patient Care Team  Primary Care Provider: Matthew Allen MD    Past Medical History:     Allergies   Allergen Reactions    Morphine Shortness Of Breath    Penicillins Hives     Past Medical History:   Diagnosis Date    Colon polyp     Diabetes mellitus     Hypertension     Sleep apnea      Past Surgical History:   Procedure Laterality Date    APPENDECTOMY      BACK SURGERY      COLONOSCOPY  07/2020    COLONOSCOPY N/A 3/26/2024    Procedure: COLONOSCOPY WITH BIOPSIES;  Surgeon: Jesus Silvestre MD;  Location: [facility] ENDOSCOPY;  Service: Gastroenterology;  Laterality: N/A;  DIVERTICULOSIS, HEMORRHOIDS, RECTAL POLYP    KNEE ARTHROSCOPY W/ MENISCECTOMY Right     VASECTOMY       Family History   Problem Relation Age of Onset    Diabetes Mother     Hypertension Mother     Hyperlipidemia Mother     Diabetes Father     Hypertension Father     Hyperlipidemia Father     Diabetes Brother     Stroke Brother     Hypertension Brother     Hyperlipidemia Brother     Cancer Other     Heart disease Other     Colon cancer Neg Hx        Home Medications:  Prior to Admission medications    Medication Sig Start Date End Date Taking? Authorizing Provider   aspirin 81 MG EC tablet Take 1 tablet by mouth Daily.    Provider, MD Sridevi   atorvastatin  (LIPITOR) 20 MG tablet Take 1 tablet by mouth Daily.    Sridevi Cummins MD   diclofenac (VOLTAREN) 75 MG EC tablet Take 1 tablet by mouth 2 (Two) Times a Day. 8/5/22   Juan Pablo Samuel MD   dicyclomine (BENTYL) 20 MG tablet Take 1 tablet by mouth Every 8 (Eight) Hours As Needed (Abdominal crampy pains). 7/27/22   Almas Cosme MD   diphenoxylate-atropine (LOMOTIL) 2.5-0.025 MG per tablet Take 1 tablet by mouth 4 (Four) Times a Day As Needed for Diarrhea. 7/27/22   Almas Cosme MD   dutasteride (AVODART) 0.5 MG capsule  8/17/23   Sridevi Cummins MD   fluticasone (FLONASE) 50 MCG/ACT nasal spray  5/22/23   Sridevi Cummins MD   hydroCHLOROthiazide (HYDRODIURIL) 12.5 MG tablet Take 1 tablet by mouth Daily.    Sridevi Cummins MD   levocetirizine (XYZAL) 5 MG tablet  8/17/23   Sridevi Cummins MD   lisinopril (PRINIVIL,ZESTRIL) 20 MG tablet Take 1 tablet by mouth Daily.    Sridevi Cummins MD   meclizine (ANTIVERT) 25 MG tablet Take 1 tablet by mouth 3 (Three) Times a Day As Needed for Dizziness. 6/13/21   Liam Lopez DO   metFORMIN (GLUCOPHAGE) 500 MG tablet Take 1 tablet by mouth 2 (two) times a day.    Sridevi Cummins MD   naproxen (NAPROSYN) 500 MG tablet  8/2/22   Sridevi Cummins MD   nebivolol (BYSTOLIC) 10 MG tablet  8/17/23   Sridevi Cummins MD   ondansetron ODT (ZOFRAN-ODT) 4 MG disintegrating tablet Place 1 tablet on the tongue Every 6 (Six) Hours As Needed for Nausea or Vomiting. 7/27/22   Almas Cosme MD   polyethylene glycol (GoLYTELY) 236 g solution Mix solution.  Drink 2/3 of solution at 6:00 PM on the evening prior to procedure.  Drink remaining 1/3 of solution four hours prior to the scheduled arrival-time on the morning of the procedure. 4/3/24   Tess Olvera APRN   pregabalin (LYRICA) 75 MG capsule  10/7/22   Provider, MD Sridevi   tamsulosin (FLOMAX) 0.4 MG capsule 24 hr capsule Take 1 capsule by mouth Daily.    Provider,  "Historical, MD        Social History:   Social History     Tobacco Use    Smoking status: Never    Smokeless tobacco: Never   Vaping Use    Vaping status: Never Used   Substance Use Topics    Alcohol use: Never    Drug use: Never         Review of Systems:  Review of Systems   Constitutional: Negative.    HENT: Negative.     Eyes: Negative.    Respiratory: Negative.     Cardiovascular: Negative.    Gastrointestinal: Negative.  Negative for nausea and vomiting.   Endocrine: Negative.    Genitourinary: Negative.    Musculoskeletal: Negative.    Skin:  Positive for wound.   Allergic/Immunologic: Negative.    Neurological: Negative.  Negative for dizziness, syncope and headaches.   Hematological: Negative.    Psychiatric/Behavioral: Negative.          Physical Exam:  /94 (BP Location: Left arm, Patient Position: Sitting)   Pulse 76   Temp 98.4 °F (36.9 °C) (Oral)   Resp 16   Ht 194.3 cm (76.5\")   Wt (!) 153 kg (336 lb 6.8 oz)   SpO2 94%   BMI 40.42 kg/m²         Physical Exam  Vitals and nursing note reviewed.   Constitutional:       Appearance: Normal appearance. He is obese.   HENT:      Head: Normocephalic. Laceration present. No contusion.        Nose: Nose normal.      Mouth/Throat:      Mouth: Mucous membranes are moist.   Eyes:      General: No scleral icterus.        Right eye: No discharge.         Left eye: No discharge.      Extraocular Movements: Extraocular movements intact.      Conjunctiva/sclera: Conjunctivae normal.      Pupils: Pupils are equal, round, and reactive to light.   Cardiovascular:      Rate and Rhythm: Normal rate and regular rhythm.      Heart sounds: Normal heart sounds.   Pulmonary:      Effort: Pulmonary effort is normal.      Breath sounds: Normal breath sounds.   Musculoskeletal:         General: Normal range of motion.      Cervical back: Normal range of motion and neck supple.   Skin:     General: Skin is warm and dry.      Coloration: Skin is not cyanotic. "   Neurological:      General: No focal deficit present.      Mental Status: He is alert and oriented to person, place, and time.   Psychiatric:         Attention and Perception: Attention and perception normal.         Mood and Affect: Mood normal.         Behavior: Behavior normal.                  Procedures:  Laceration Repair    Date/Time: 5/18/2024 10:30 PM    Performed by: Buster Lopez PA-C  Authorized by: Blake Allen DO    Consent:     Consent obtained:  Verbal    Consent given by:  Patient    Risks discussed:  Pain and infection  Universal protocol:     Patient identity confirmed:  Verbally with patient  Anesthesia:     Anesthesia method:  Local infiltration    Local anesthetic:  Lidocaine 1% WITH epi  Laceration details:     Location:  Scalp    Scalp location:  Frontal    Length (cm):  4  Pre-procedure details:     Preparation:  Patient was prepped and draped in usual sterile fashion  Exploration:     Hemostasis achieved with:  Direct pressure  Treatment:     Area cleansed with:  Povidone-iodine    Amount of cleaning:  Standard    Irrigation volume:  20 cc  Skin repair:     Repair method:  Sutures    Suture size:  6-0    Suture material:  Nylon    Suture technique:  Simple interrupted    Number of sutures:  4  Approximation:     Approximation:  Close  Repair type:     Repair type:  Simple  Post-procedure details:     Dressing:  Antibiotic ointment    Procedure completion:  Tolerated well, no immediate complications        Medical Decision Making:      Comorbidities that affect care:    Diabetes, Hypertension    External Notes reviewed:    Previous Clinic Note: Office visit with neurosurgery 10/11/2020      The following orders were placed and all results were independently analyzed by me:  Orders Placed This Encounter   Procedures    Laceration Repair       Medications Given in the Emergency Department:  Medications   bacitracin 500 UNIT/GM ointment 0.9 g (has no administration in time range)    Tetanus-Diphth-Acell Pertussis (BOOSTRIX) injection 0.5 mL (0.5 mL Intramuscular Given 5/18/24 2137)        ED Course:    The patient was initially evaluated in the triage area where orders were placed. The patient was later dispositioned by Buster Lopez PA-C.      The patient was advised to stay for completion of workup which includes but is not limited to communication of labs and radiological results, reassessment and plan. The patient was advised that leaving prior to disposition by a provider could result in critical findings that are not communicated to the patient.     ED Course as of 05/18/24 2233   Sat May 18, 2024   2046 PROVIDER IN TRIAGE  Patient was evaluated by me in triage, Trell Mckeon PA-C.  Orders were placed and patient is currently awaiting final results and disposition.  [MD]      ED Course User Index  [MD] Trell Mckeon PA-C       Labs:    Lab Results (last 24 hours)       ** No results found for the last 24 hours. **             Imaging:    No Radiology Exams Resulted Within Past 24 Hours      Differential Diagnosis and Discussion:      Laceration: Laceration was evaluated for arterial injury, ligamentous damage, and other neurovascular injury.        MDM               Patient Care Considerations:    CT HEAD: I considered ordering a noncontrast CT of the head, however denies LOC.  No neurodeficits      Consultants/Shared Management Plan:    None    Social Determinants of Health:    Patient is independent, reliable, and has access to care.       Disposition and Care Coordination:    Discharged: The patient is suitable and stable for discharge with no need for consideration of admission.    I have explained the patient´s condition, diagnoses and treatment plan based on the information available to me at this time. I have answered questions and addressed any concerns. The patient has a good  understanding of the patient´s diagnosis, condition, and treatment plan as can be  expected at this point. The vital signs have been stable. The patient´s condition is stable and appropriate for discharge from the emergency department.      The patient will pursue further outpatient evaluation with the primary care physician or other designated or consulting physician as outlined in the discharge instructions. They are agreeable to this plan of care and follow-up instructions have been explained in detail. The patient has received these instructions in written format and has expressed an understanding of the discharge instructions. The patient is aware that any significant change in condition or worsening of symptoms should prompt an immediate return to this or the closest emergency department or call to 911.    Final diagnoses:   Scalp laceration, initial encounter        ED Disposition       ED Disposition   Discharge    Condition   Stable    Comment   --               This medical record created using voice recognition software.             Buster Lopez PA-C  05/18/24 1096

## 2024-05-19 NOTE — DISCHARGE INSTRUCTIONS
Keep it clean and dry.  Apply antibiotic ointment twice daily.  Follow-up with your PCP in 3 to 5 days for suture removal

## 2024-05-19 NOTE — ED TRIAGE NOTES
Pt to ED from home with head laceration.      Pt states he was weed eating and stood up, hitting head on overhand of shed.      Pt denies LOC and reports dizziness initially, but denies dizziness currently.      No active bleeding noted, tetanus not up to date.

## 2024-06-04 NOTE — PRE-PROCEDURE INSTRUCTIONS
Instructed on date and arrival time of 1030 Come to entrance 'C'. Must have a  over age of 18 to drive home.May have two visitors; however children under the age of 12 must stay in waiting room. Discussed clear liquid diet(no red or purple), bowel prep,and NPO. May take medications as usual except for blood thinners,diabetic medications, and weight loss medications.verbalizes understanding of instructions given.Instructed to call for questions or concerns.

## 2024-06-10 ENCOUNTER — TELEPHONE (OUTPATIENT)
Dept: GASTROENTEROLOGY | Facility: CLINIC | Age: 54
End: 2024-06-10
Payer: MEDICARE

## 2024-06-10 NOTE — TELEPHONE ENCOUNTER
"Rogelio Damian  1970    Patient requested to Reschedule their Colonoscopy. I have offered to reschedule this patient and patient has agreed. Patient has been rescheduled to 6.28.24 @ 1:00.    Reason for cancelling/rescheduling: \"Sick\"    This procedure was ordered by Jose Miguel Silvestre MD for an important reason. We want to inform you that there are risks associated with not proceeding with the procedure at this time such as a delay in diagnosis, risk of incurable disease, or cancer.    Updated clearance needed?: No    Is the patient currently on any injectable medications for weight loss or diabetes? No    Patient verbalized understanding for all of the above information.    "

## 2024-06-21 NOTE — PRE-PROCEDURE INSTRUCTIONS
"Instructed on date and arrival time of 1200. Come to entrance \"C\". Must have  over age 18 to drive home.  May have two visitors; however, children under 12 must stay in waiting room.  Discussed clear liquid diet (no red or purple), bowel prep, and NPO.  May take medications as usual except for blood thinners, diabetic medications, and weight loss medications.  Verbalized understanding of instructions given.  Instructed to call for questions or concerns.  "

## 2024-06-26 ENCOUNTER — OFFICE VISIT (OUTPATIENT)
Dept: NEUROSURGERY | Facility: CLINIC | Age: 54
End: 2024-06-26
Payer: MEDICARE

## 2024-06-26 VITALS
HEIGHT: 77 IN | SYSTOLIC BLOOD PRESSURE: 164 MMHG | DIASTOLIC BLOOD PRESSURE: 95 MMHG | WEIGHT: 315 LBS | BODY MASS INDEX: 37.19 KG/M2

## 2024-06-26 DIAGNOSIS — R20.0 LEFT ARM NUMBNESS: ICD-10-CM

## 2024-06-26 DIAGNOSIS — M50.222 HERNIATED NUCLEUS PULPOSUS, C5-6 LEFT: Primary | ICD-10-CM

## 2024-06-26 DIAGNOSIS — M79.602 LEFT ARM PAIN: ICD-10-CM

## 2024-06-26 DIAGNOSIS — M54.2 CERVICALGIA: ICD-10-CM

## 2024-06-26 PROCEDURE — 1159F MED LIST DOCD IN RCRD: CPT | Performed by: PHYSICIAN ASSISTANT

## 2024-06-26 PROCEDURE — 99213 OFFICE O/P EST LOW 20 MIN: CPT | Performed by: PHYSICIAN ASSISTANT

## 2024-06-26 PROCEDURE — 1160F RVW MEDS BY RX/DR IN RCRD: CPT | Performed by: PHYSICIAN ASSISTANT

## 2024-06-26 RX ORDER — CELECOXIB 200 MG/1
200 CAPSULE ORAL DAILY
Status: ON HOLD | COMMUNITY
Start: 2024-06-24 | End: 2024-06-28

## 2024-06-26 RX ORDER — FENOFIBRATE 134 MG/1
134 CAPSULE ORAL
COMMUNITY
Start: 2024-06-24

## 2024-06-26 NOTE — PROGRESS NOTES
Patient being seen for today for Neck Pain  .    Subjective    Rogelio Damian is a 54 y.o. male that presents with Neck Pain  .    Neck Pain   Associated symptoms include numbness.     Previously: Last seen on 10/11/2022 for pain down the left arm to the elbow with numbness to the thumb at the left hand.  He had a left disc extrusion at C5-C6 with severe foraminal stenosis on the left.  We discussed he may benefit from ACDF.  There is recommendation for conservative treatment including physical therapy or trial of cervical epidural steroid blocks.  He deferred physical therapy.  There was referral to pain management to discuss spinal injections.    Today: He reports pain in the neck and left shoulder down the arm at least to the elbow, intermittent numbness to the thumb and pinky of the left hand.     reports that he has never smoked. He has never used smokeless tobacco.    Review of Systems   Musculoskeletal:  Positive for myalgias and neck pain.   Neurological:  Positive for numbness.       Objective   Vitals:    06/26/24 0857   BP: 164/95        Physical Exam  Constitutional:       Appearance: Normal appearance. He is obese.   Neck:      Comments: Pain with ROM  Pulmonary:      Effort: Pulmonary effort is normal.   Musculoskeletal:         General: Tenderness (left cervical area) present.      Comments: Tinel's positive at the left elbow,  Canela's negative bilaterally,  Spurling's test positive on the left   Neurological:      General: No focal deficit present.      Mental Status: He is alert and oriented to person, place, and time.      Sensory: No sensory deficit.      Motor: Weakness (left hand , left elbow flexion) present.      Deep Tendon Reflexes: Reflexes normal.   Psychiatric:         Mood and Affect: Mood normal.         Behavior: Behavior normal.          Result Review   I have personally reviewed the radiology report for MRI of the cervical spine from 9/19/2022 which shows disc extrusion to  the left at C5-C6 causing severe left foraminal stenosis.     Assessment and Plan {CC Problem List  Visit Diagnosis  ROS  Review (Popup)  [facility]  BestPractice  Medications  SmartSets  SnapShot Encounters  Media :23}   Diagnoses and all orders for this visit:    1. Herniated nucleus pulposus, C5-6 left (Primary)  -     MRI Cervical Spine Without Contrast; Future  -     Ambulatory Referral to Pain Management    2. Cervicalgia  -     MRI Cervical Spine Without Contrast; Future  -     Ambulatory Referral to Pain Management    3. Left arm pain  -     MRI Cervical Spine Without Contrast; Future  -     Ambulatory Referral to Pain Management    4. Left arm numbness  -     MRI Cervical Spine Without Contrast; Future  -     Ambulatory Referral to Pain Management    He has neck and left arm pain at least to the elbow, with intermittent numbness to the 1st and 5th digits of the left hand.    He did have a disc extrusion on the left at C5-C6 which was causing severe stenosis foraminally. This was on MRI from 2022.    I will order a new MRI of the cervical spine to assess for worsened spine pathology.    He has positive tinel's testing at the left elbow and we discussed NCV/EMG testing to assess for ulnar neuropathy. He is deferring today.    I will refer him to pain management to discuss possible medication management or MILY trial.    The patient was counseled on basic recommendations for the reduction and prevention of back, neck, or spine pain in association with spinal disorders, including: cessation/avoidance of nicotine use, maintenance of a healthy BMI and weight, focusing on building/maintaining core strength through core exercise, and avoidance of activities which worsen the pain. The patient will monitor for changes in symptoms and notify our clinic of these changes as needed.    He will follow-up here in 4 weeks to reassess and review imaging.    Follow Up {Instructions Charge Capture   Follow-up Communications :23}   Return in about 4 weeks (around 7/24/2024).

## 2024-06-27 ENCOUNTER — ANESTHESIA EVENT (OUTPATIENT)
Dept: GASTROENTEROLOGY | Facility: HOSPITAL | Age: 54
End: 2024-06-27
Payer: MEDICARE

## 2024-06-27 NOTE — ANESTHESIA PREPROCEDURE EVALUATION
Anesthesia Evaluation     Patient summary reviewed and Nursing notes reviewed   NPO Solid Status: > 8 hours  NPO Liquid Status: > 4 hours           Airway   Mallampati: II  TM distance: >3 FB  Neck ROM: full  Possible difficult intubation and Large neck circumference  Comment: Patient states that he had to be intubated for his last Colonoscopy. He thinks it was related to his sleep apnea   Dental - normal exam     Pulmonary - normal exam    breath sounds clear to auscultation  (+) ,sleep apnea (noncompliant with Cpap)    ROS comment: Does not wear Cpap  Cardiovascular   Exercise tolerance: good (4-7 METS)    Rhythm: regular  Rate: abnormal    (+) hypertension well controlled, dysrhythmias Bradycardia    ROS comment: No meds today     Bradycardia and occassional palpitations   PE comment: HR alexander 50- 55     Neuro/Psych  GI/Hepatic/Renal/Endo    (+) obesity, morbid obesity, diabetes mellitus type 2 well controlled    ROS Comment: Patient only has 1 kidney but it functions well.     Musculoskeletal     Abdominal   (+) obese    Abdomen: soft.   Substance History      OB/GYN          Other        ROS/Med Hx Other: Holter monitor reading 11/11/21:   ·A relatively benign monitor study.  ·Frequent PACs. Rare PAC couplets, triplets, and atrial bigeminy.  ·Rare PVC and couplets.  ·5 very short runs of SVT anywhere from 4-6 beats.    No EKG on file             Phys Exam Other: Full beard               Anesthesia Plan    ASA 3     general   total IV anesthesia  (Total IV Anesthesia    Patient understands anesthesia not responsible for dental damage.  )  intravenous induction     Anesthetic plan, risks, benefits, and alternatives have been provided, discussed and informed consent has been obtained with: patient.  Pre-procedure education provided  Plan discussed with CRNA.    CODE STATUS:

## 2024-06-28 ENCOUNTER — ANESTHESIA (OUTPATIENT)
Dept: GASTROENTEROLOGY | Facility: HOSPITAL | Age: 54
End: 2024-06-28
Payer: MEDICARE

## 2024-06-28 ENCOUNTER — HOSPITAL ENCOUNTER (OUTPATIENT)
Facility: HOSPITAL | Age: 54
Setting detail: HOSPITAL OUTPATIENT SURGERY
Discharge: HOME | End: 2024-06-28
Attending: INTERNAL MEDICINE | Admitting: INTERNAL MEDICINE
Payer: MEDICARE

## 2024-06-28 VITALS
HEART RATE: 69 BPM | SYSTOLIC BLOOD PRESSURE: 144 MMHG | HEIGHT: 76 IN | TEMPERATURE: 97.7 F | BODY MASS INDEX: 38.36 KG/M2 | RESPIRATION RATE: 18 BRPM | OXYGEN SATURATION: 92 % | WEIGHT: 315 LBS | DIASTOLIC BLOOD PRESSURE: 78 MMHG

## 2024-06-28 LAB — GLUCOSE BLDC GLUCOMTR-MCNC: 103 MG/DL (ref 70–99)

## 2024-06-28 PROCEDURE — 82948 REAGENT STRIP/BLOOD GLUCOSE: CPT

## 2024-06-28 PROCEDURE — 25010000002 PROPOFOL 10 MG/ML EMULSION

## 2024-06-28 PROCEDURE — 25810000003 LACTATED RINGERS PER 1000 ML

## 2024-06-28 RX ORDER — SODIUM CHLORIDE, SODIUM LACTATE, POTASSIUM CHLORIDE, CALCIUM CHLORIDE 600; 310; 30; 20 MG/100ML; MG/100ML; MG/100ML; MG/100ML
30 INJECTION, SOLUTION INTRAVENOUS CONTINUOUS
Status: DISCONTINUED | OUTPATIENT
Start: 2024-06-28 | End: 2024-06-28 | Stop reason: HOSPADM

## 2024-06-28 RX ORDER — PROPOFOL 10 MG/ML
VIAL (ML) INTRAVENOUS AS NEEDED
Status: DISCONTINUED | OUTPATIENT
Start: 2024-06-28 | End: 2024-06-28 | Stop reason: SURG

## 2024-06-28 RX ORDER — LIDOCAINE HYDROCHLORIDE 20 MG/ML
INJECTION, SOLUTION EPIDURAL; INFILTRATION; INTRACAUDAL; PERINEURAL AS NEEDED
Status: DISCONTINUED | OUTPATIENT
Start: 2024-06-28 | End: 2024-06-28 | Stop reason: SURG

## 2024-06-28 RX ADMIN — PROPOFOL 200 MCG/KG/MIN: 10 INJECTION, EMULSION INTRAVENOUS at 14:03

## 2024-06-28 RX ADMIN — LIDOCAINE HYDROCHLORIDE 100 MG: 20 INJECTION, SOLUTION INTRAVENOUS at 14:03

## 2024-06-28 RX ADMIN — PROPOFOL 80 MG: 10 INJECTION, EMULSION INTRAVENOUS at 14:03

## 2024-06-28 RX ADMIN — SODIUM CHLORIDE, POTASSIUM CHLORIDE, SODIUM LACTATE AND CALCIUM CHLORIDE 30 ML/HR: 600; 310; 30; 20 INJECTION, SOLUTION INTRAVENOUS at 13:44

## 2024-06-28 NOTE — H&P
Pre Procedure History & Physical    Chief Complaint:   Rectal polyp    Subjective     HPI:   Rectal polyp    Past Medical History:   Past Medical History:   Diagnosis Date    Colon polyp     Diabetes mellitus     Hypertension     Sleep apnea        Past Surgical History:  Past Surgical History:   Procedure Laterality Date    APPENDECTOMY      BACK SURGERY      COLONOSCOPY  07/2020    COLONOSCOPY N/A 3/26/2024    Procedure: COLONOSCOPY WITH BIOPSIES;  Surgeon: Jesus Silvestre MD;  Location: [facility] ENDOSCOPY;  Service: Gastroenterology;  Laterality: N/A;  DIVERTICULOSIS, HEMORRHOIDS, RECTAL POLYP    KNEE ARTHROSCOPY W/ MENISCECTOMY Right     VASECTOMY         Family History:  Family History   Problem Relation Age of Onset    Diabetes Mother     Hypertension Mother     Hyperlipidemia Mother     Diabetes Father     Hypertension Father     Hyperlipidemia Father     Diabetes Brother     Stroke Brother     Hypertension Brother     Hyperlipidemia Brother     Cancer Other     Heart disease Other     Colon cancer Neg Hx        Social History:   reports that he has never smoked. He has never used smokeless tobacco. He reports that he does not drink alcohol and does not use drugs.    Medications:   Medications Prior to Admission   Medication Sig Dispense Refill Last Dose    aspirin 81 MG EC tablet Take 1 tablet by mouth Daily.       atorvastatin (LIPITOR) 20 MG tablet Take 1 tablet by mouth Daily.       celecoxib (CeleBREX) 200 MG capsule Take 1 capsule by mouth Daily. (Patient not taking: Reported on 6/26/2024)       cholecalciferol (VITAMIN D3) 1.25 MG (53091 UT) capsule Take 1 capsule by mouth Every 7 (Seven) Days.       diclofenac (VOLTAREN) 75 MG EC tablet Take 1 tablet by mouth 2 (Two) Times a Day. (Patient not taking: Reported on 6/26/2024) 60 tablet 0     dicyclomine (BENTYL) 20 MG tablet Take 1 tablet by mouth Every 8 (Eight) Hours As Needed (Abdominal crampy pains). (Patient not taking: Reported on 6/26/2024) 20  tablet 0     diphenoxylate-atropine (LOMOTIL) 2.5-0.025 MG per tablet Take 1 tablet by mouth 4 (Four) Times a Day As Needed for Diarrhea. (Patient not taking: Reported on 6/26/2024) 20 tablet 0     dutasteride (AVODART) 0.5 MG capsule        fenofibrate micronized (LOFIBRA) 134 MG capsule Take 1 capsule by mouth Every Morning Before Breakfast.       fluticasone (FLONASE) 50 MCG/ACT nasal spray  (Patient not taking: Reported on 6/26/2024)       hydroCHLOROthiazide (HYDRODIURIL) 12.5 MG tablet Take 1 tablet by mouth Daily.       levocetirizine (XYZAL) 5 MG tablet  (Patient not taking: Reported on 6/26/2024)       lisinopril (PRINIVIL,ZESTRIL) 20 MG tablet Take 1 tablet by mouth Daily.       meclizine (ANTIVERT) 25 MG tablet Take 1 tablet by mouth 3 (Three) Times a Day As Needed for Dizziness. (Patient not taking: Reported on 6/26/2024) 15 tablet 0     metFORMIN (GLUCOPHAGE) 500 MG tablet Take 1 tablet by mouth 2 (two) times a day.       Multiple Minerals (ZINC BALANCE PO) Take  by mouth.       naproxen (NAPROSYN) 500 MG tablet  (Patient not taking: Reported on 6/26/2024)       nebivolol (BYSTOLIC) 10 MG tablet        Omega 3-6-9 Fatty Acids (OMEGA 3-6-9 PO) Take  by mouth.       ondansetron ODT (ZOFRAN-ODT) 4 MG disintegrating tablet Place 1 tablet on the tongue Every 6 (Six) Hours As Needed for Nausea or Vomiting. (Patient not taking: Reported on 6/26/2024) 12 tablet 0     polyethylene glycol (GoLYTELY) 236 g solution Mix solution.  Drink 2/3 of solution at 6:00 PM on the evening prior to procedure.  Drink remaining 1/3 of solution four hours prior to the scheduled arrival-time on the morning of the procedure. (Patient not taking: Reported on 6/26/2024) 4000 mL 0     pregabalin (LYRICA) 75 MG capsule        tamsulosin (FLOMAX) 0.4 MG capsule 24 hr capsule Take 1 capsule by mouth Daily.          Allergies:  Morphine and Penicillins        Objective     Blood pressure 155/93, pulse 55, temperature 97.9 °F (36.6 °C),  "temperature source Temporal, resp. rate 20, height 193 cm (76\"), weight (!) 148 kg (326 lb 4.5 oz), SpO2 93%.    Physical Exam   Constitutional: Pt is oriented to person, place, and time and well-developed, well-nourished, and in no distress.   Mouth/Throat: Oropharynx is clear and moist.   Neck: Normal range of motion.   Cardiovascular: Normal rate, regular rhythm and normal heart sounds.    Pulmonary/Chest: Effort normal and breath sounds normal.   Abdominal: Soft. Nontender  Skin: Skin is warm and dry.   Psychiatric: Mood, memory, affect and judgment normal.     Assessment & Plan     Diagnosis:  Hyperplastic rectal polyp    Anticipated Surgical Procedure:  Flexible sigmoidoscopy    The risks, benefits, and alternatives of this procedure have been discussed with the patient or the responsible party- the patient understands and agrees to proceed.            "

## 2024-06-28 NOTE — ANESTHESIA POSTPROCEDURE EVALUATION
Patient: Rogelio Daiman    Procedure Summary       Date: 06/28/24 Room / Location: [facility] ENDOSCOPY 4 / [facility] ENDOSCOPY    Anesthesia Start: 1359 Anesthesia Stop: 1429    Procedure: SIGMOIDOSCOPY FLEXIBLE (Anus) Diagnosis:       Hyperplastic colonic polyp, unspecified part of colon      (Hyperplastic colonic polyp, unspecified part of colon [K63.5])    Surgeons: Jesus Silvestre MD Provider: Lizbeth Butterfield CRNA    Anesthesia Type: general ASA Status: 3            Anesthesia Type: general    Vitals  Vitals Value Taken Time   /78 06/28/24 1441   Temp 36.5 °C (97.7 °F) 06/28/24 1440   Pulse 70 06/28/24 1442   Resp 18 06/28/24 1440   SpO2 92 % 06/28/24 1442   Vitals shown include unfiled device data.        Post Anesthesia Care and Evaluation    Post-procedure mental status: acceptable.  Pain management: satisfactory to patient    Airway patency: patent  Anesthetic complications: No anesthetic complications    Cardiovascular status: acceptable  Respiratory status: acceptable    Comments: Per chart review

## 2024-07-06 ENCOUNTER — HOSPITAL ENCOUNTER (OUTPATIENT)
Dept: OTHER | Facility: HOSPITAL | Age: 54
Discharge: HOME | End: 2024-07-06

## 2024-07-08 ENCOUNTER — TELEPHONE (OUTPATIENT)
Dept: GASTROENTEROLOGY | Facility: CLINIC | Age: 54
End: 2024-07-08
Payer: MEDICARE

## 2024-07-08 NOTE — TELEPHONE ENCOUNTER
Flex sig 6/28/2024: Good prep, diverticula found in the sigmoid, grade 1 internal hemorrhoids, no specimens  Recall colon 5 yrs , phx polyp (r/w Dr Silvestre also)  Send letter

## 2024-07-09 DIAGNOSIS — M79.602 LEFT ARM PAIN: ICD-10-CM

## 2024-07-09 DIAGNOSIS — M50.222 HERNIATED NUCLEUS PULPOSUS, C5-6 LEFT: ICD-10-CM

## 2024-07-09 DIAGNOSIS — R20.0 LEFT ARM NUMBNESS: ICD-10-CM

## 2024-07-09 DIAGNOSIS — M54.2 CERVICALGIA: ICD-10-CM

## 2024-07-24 ENCOUNTER — OFFICE VISIT (OUTPATIENT)
Dept: NEUROSURGERY | Facility: CLINIC | Age: 54
End: 2024-07-24
Payer: MEDICARE

## 2024-07-24 VITALS
WEIGHT: 315 LBS | BODY MASS INDEX: 38.36 KG/M2 | HEART RATE: 65 BPM | DIASTOLIC BLOOD PRESSURE: 103 MMHG | HEIGHT: 76 IN | SYSTOLIC BLOOD PRESSURE: 162 MMHG

## 2024-07-24 DIAGNOSIS — R20.0 LEFT ARM NUMBNESS: ICD-10-CM

## 2024-07-24 DIAGNOSIS — M50.221 HERNIATED NUCLEUS PULPOSUS, C4-5 RIGHT: ICD-10-CM

## 2024-07-24 DIAGNOSIS — M50.222 HERNIATED NUCLEUS PULPOSUS, C5-6 LEFT: Primary | ICD-10-CM

## 2024-07-24 DIAGNOSIS — M54.2 CERVICALGIA: ICD-10-CM

## 2024-07-24 DIAGNOSIS — M79.602 LEFT ARM PAIN: ICD-10-CM

## 2024-07-24 NOTE — PROGRESS NOTES
Patient being seen for today for Follow-up  .    Subjective    Rogelio Damian is a 54 y.o. male that presents with Follow-up  .    HPI  Previously: Last seen on 6/26/2024 for complaints of neck pain and left arm pain to at least the elbow with intermittent numbness to the first through fifth digits of the left hand.  On MRI from 2022 he did have a disc extrusion on the left at C5-C6 causing severe stenosis foraminally.  There was an order for new MRI of the cervical spine to assess for worsened spine pathology.  He also had positive Tinel's testing of the left elbow and we discussed NCV/EMG testing which she deferred.  There was a referral to pain management to discuss possible medication management or cervical epidural steroid injections.    Today: He continues to have pain in the neck and left arm to the elbow.    He has numbness and tingling to the left arm sometimes to the hand, especially the 1st-5th digits.     reports that he has never smoked. He has never used smokeless tobacco.    Review of Systems   Musculoskeletal:  Positive for neck pain.   Neurological:  Positive for weakness and numbness.       Objective   Vitals:    07/24/24 1327   BP: (!) 162/103   Pulse: 65        Physical Exam  Constitutional:       Appearance: Normal appearance. He is obese.   Pulmonary:      Effort: Pulmonary effort is normal.   Musculoskeletal:         General: Tenderness present.      Comments: Canela's negative,  Tinel's positive at the left elbow   Neurological:      General: No focal deficit present.      Mental Status: He is alert and oriented to person, place, and time.      Motor: Weakness (left hand ) present.      Deep Tendon Reflexes: Reflexes normal.   Psychiatric:         Mood and Affect: Mood normal.         Behavior: Behavior normal.          Result Review   I have personally interpreted the MRI of cervical spine without contrast from 7/6/2024 which shows left disc bulging at C5-C6 with severe left  foraminal stenosis.  There is also severe right foraminal narrowing at C4-C5.     Assessment and Plan {CC Problem List  Visit Diagnosis  ROS  Review (Popup)  [facility]  Quality  BestPractice  Medications  SmartSets  SnapShot Encounters  Media :23}   Diagnoses and all orders for this visit:    1. Herniated nucleus pulposus, C5-6 left (Primary)    2. Herniated nucleus pulposus, C4-5 right    3. Cervicalgia    4. Left arm pain    5. Left arm numbness    He has continued pain in the neck and the left arm to the elbow.    He has severe stenosis at C5-C6 on the left.    We discussed surgery may or may not help left arm pain to the elbow, it would not typically help the neck pain.    He is hopeful to avoid surgery.    He is scheduled for MILY on 7/29/24. He plans to complete the trial.    The patient was counseled on basic recommendations for the reduction and prevention of back, neck, or spine pain in association with spinal disorders, including: cessation/avoidance of nicotine use, maintenance of a healthy BMI and weight, focusing on building/maintaining core strength through core exercise, and avoidance of activities which worsen the pain. The patient will monitor for changes in symptoms and notify our clinic of these changes as needed.    He will follow-up here PRN.  Follow Up {Instructions Charge Capture  Follow-up Communications :23}   Return if symptoms worsen or fail to improve.

## 2024-07-29 NOTE — TELEPHONE ENCOUNTER
I have added pt to 5 yr Colon Recall for 6/28/2029 and updated Health Maintenance. Letter sent to patient.

## 2024-08-15 ENCOUNTER — APPOINTMENT (OUTPATIENT)
Dept: CT IMAGING | Facility: HOSPITAL | Age: 54
End: 2024-08-15
Payer: MEDICARE

## 2024-08-15 ENCOUNTER — HOSPITAL ENCOUNTER (EMERGENCY)
Facility: HOSPITAL | Age: 54
Discharge: HOME | End: 2024-08-15
Attending: EMERGENCY MEDICINE
Payer: MEDICARE

## 2024-08-15 VITALS
WEIGHT: 315 LBS | OXYGEN SATURATION: 93 % | BODY MASS INDEX: 38.36 KG/M2 | SYSTOLIC BLOOD PRESSURE: 108 MMHG | DIASTOLIC BLOOD PRESSURE: 80 MMHG | RESPIRATION RATE: 18 BRPM | HEIGHT: 76 IN | TEMPERATURE: 98.3 F | HEART RATE: 68 BPM

## 2024-08-15 DIAGNOSIS — M54.12 CERVICAL RADICULOPATHY: Primary | ICD-10-CM

## 2024-08-15 DIAGNOSIS — L97.519 DIABETIC ULCER OF TOE OF RIGHT FOOT ASSOCIATED WITH TYPE 2 DIABETES MELLITUS, UNSPECIFIED ULCER STAGE: ICD-10-CM

## 2024-08-15 DIAGNOSIS — E11.621 DIABETIC ULCER OF TOE OF RIGHT FOOT ASSOCIATED WITH TYPE 2 DIABETES MELLITUS, UNSPECIFIED ULCER STAGE: ICD-10-CM

## 2024-08-15 DIAGNOSIS — M62.838 MUSCLE SPASM: ICD-10-CM

## 2024-08-15 PROCEDURE — 72125 CT NECK SPINE W/O DYE: CPT

## 2024-08-15 PROCEDURE — 25010000002 HYDROMORPHONE 1 MG/ML SOLUTION: Performed by: EMERGENCY MEDICINE

## 2024-08-15 PROCEDURE — 96374 THER/PROPH/DIAG INJ IV PUSH: CPT

## 2024-08-15 PROCEDURE — 99284 EMERGENCY DEPT VISIT MOD MDM: CPT

## 2024-08-15 RX ORDER — METHYLPREDNISOLONE 4 MG/1
TABLET ORAL
Qty: 21 TABLET | Refills: 0 | Status: SHIPPED | OUTPATIENT
Start: 2024-08-15 | End: 2024-08-20

## 2024-08-15 RX ORDER — ORPHENADRINE CITRATE 100 MG/1
100 TABLET, EXTENDED RELEASE ORAL 2 TIMES DAILY
Qty: 14 TABLET | Refills: 0 | Status: SHIPPED | OUTPATIENT
Start: 2024-08-15

## 2024-08-15 RX ORDER — HYDROCODONE BITARTRATE AND ACETAMINOPHEN 7.5; 325 MG/1; MG/1
1 TABLET ORAL EVERY 4 HOURS PRN
COMMUNITY
Start: 2024-08-07

## 2024-08-15 RX ORDER — DOXYCYCLINE 100 MG/1
100 CAPSULE ORAL 2 TIMES DAILY
Qty: 20 CAPSULE | Refills: 0 | Status: SHIPPED | OUTPATIENT
Start: 2024-08-15 | End: 2024-08-25

## 2024-08-15 RX ORDER — METHOCARBAMOL 750 MG/1
1500 TABLET, FILM COATED ORAL ONCE
Status: COMPLETED | OUTPATIENT
Start: 2024-08-15 | End: 2024-08-15

## 2024-08-15 RX ADMIN — HYDROMORPHONE HYDROCHLORIDE 1 MG: 1 INJECTION, SOLUTION INTRAMUSCULAR; INTRAVENOUS; SUBCUTANEOUS at 08:19

## 2024-08-15 RX ADMIN — METHOCARBAMOL TABLETS 1500 MG: 750 TABLET, COATED ORAL at 08:20

## 2024-08-15 NOTE — ED PROVIDER NOTES
Time: 8:01 AM EDT  Date of encounter:  8/15/2024  Independent Historian/Clinical History and Information was obtained by:   Patient and Family    History is limited by: N/A    Chief Complaint: Neck pain      History of Present Illness:  Patient is a 54 y.o. year old male who presents to the emergency department for evaluation of neck pain.  Patient reports he fell asleep sitting up at his head fell down causing him to reflexively jerk it up at which time he felt pain in his neck.  He reports chronic neck pain with radiation down the left arm for which has received injections but reports now discomfort is down the right arm.    HPI    Patient Care Team  Primary Care Provider: Matthew Allen MD    Past Medical History:     Allergies   Allergen Reactions    Morphine Shortness Of Breath    Penicillins Hives     Past Medical History:   Diagnosis Date    Colon polyp     Diabetes mellitus     TYPE II    Hypertension     Sleep apnea      Past Surgical History:   Procedure Laterality Date    APPENDECTOMY      BACK SURGERY      COLONOSCOPY  07/2020    COLONOSCOPY N/A 3/26/2024    Procedure: COLONOSCOPY WITH BIOPSIES;  Surgeon: Jesus Silvestre MD;  Location: [facility] ENDOSCOPY;  Service: Gastroenterology;  Laterality: N/A;  DIVERTICULOSIS, HEMORRHOIDS, RECTAL POLYP    KNEE ARTHROSCOPY W/ MENISCECTOMY Right     SIGMOIDOSCOPY N/A 6/28/2024    Procedure: SIGMOIDOSCOPY FLEXIBLE;  Surgeon: Jesus Silvestre MD;  Location: [facility] ENDOSCOPY;  Service: Gastroenterology;  Laterality: N/A;  DIVERTICULOSIS, HEMORRHOIDS    VASECTOMY       Family History   Problem Relation Age of Onset    Diabetes Mother     Hypertension Mother     Hyperlipidemia Mother     Diabetes Father     Hypertension Father     Hyperlipidemia Father     Diabetes Brother     Stroke Brother     Hypertension Brother     Hyperlipidemia Brother     Cancer Other     Heart disease Other     Colon cancer Neg Hx        Home Medications:  Prior to Admission  medications    Medication Sig Start Date End Date Taking? Authorizing Provider   HYDROcodone-acetaminophen (NORCO) 7.5-325 MG per tablet Take 1 tablet by mouth Every 4 (Four) Hours As Needed for Moderate Pain or Severe Pain. 8/7/24  Yes Sridevi uCmmins MD   aspirin 81 MG EC tablet Take 1 tablet by mouth Daily.    Sridevi Cummins MD   atorvastatin (LIPITOR) 20 MG tablet Take 1 tablet by mouth Daily.    Sridevi Cummins MD   cholecalciferol (VITAMIN D3) 1.25 MG (58852 UT) capsule Take 1 capsule by mouth Every 7 (Seven) Days.    Sridevi Cummins MD   dicyclomine (BENTYL) 20 MG tablet Take 1 tablet by mouth Every 8 (Eight) Hours As Needed (Abdominal crampy pains). 7/27/22   Almas Cosme MD   dutasteride (AVODART) 0.5 MG capsule  8/17/23   Sridevi Cummins MD   fenofibrate micronized (LOFIBRA) 134 MG capsule Take 1 capsule by mouth Every Morning Before Breakfast. 6/24/24   Sridevi Cummins MD   fluticasone (FLONASE) 50 MCG/ACT nasal spray  5/22/23   Sridevi Cummins MD   hydroCHLOROthiazide (HYDRODIURIL) 12.5 MG tablet Take 1 tablet by mouth Daily.    Sridevi Cummins MD   levocetirizine (XYZAL) 5 MG tablet  8/17/23   Sridevi Cummins MD   lisinopril (PRINIVIL,ZESTRIL) 20 MG tablet Take 1 tablet by mouth Daily.    Sridevi Cummins MD   metFORMIN (GLUCOPHAGE) 500 MG tablet Take 1 tablet by mouth 2 (two) times a day.    Sridevi Cummins MD   methylcellulose, Laxative, (CITRUCEL) 500 MG tablet tablet Take 2 tablets by mouth Every 4 (Four) Hours As Needed.    Sridevi Cummins MD   naproxen (NAPROSYN) 500 MG tablet  8/2/22   Sridevi Cummins MD   nebivolol (BYSTOLIC) 10 MG tablet  8/17/23   Sridevi Cummins MD   Omega 3-6-9 Fatty Acids (OMEGA 3-6-9 PO) Take  by mouth.    Sridevi Cummins MD   ondansetron ODT (ZOFRAN-ODT) 4 MG disintegrating tablet Place 1 tablet on the tongue Every 6 (Six) Hours As Needed for Nausea or Vomiting. 7/27/22   Ba  "MD Almas   pregabalin (LYRICA) 75 MG capsule  10/7/22   ProviderSridevi MD   tamsulosin (FLOMAX) 0.4 MG capsule 24 hr capsule Take 1 capsule by mouth Daily.    Provider, MD Sridevi        Social History:   Social History     Tobacco Use    Smoking status: Never    Smokeless tobacco: Never   Vaping Use    Vaping status: Never Used   Substance Use Topics    Alcohol use: Never    Drug use: Never         Review of Systems:  Review of Systems   Constitutional:  Negative for chills and fever.   HENT:  Negative for congestion, rhinorrhea and sore throat.    Eyes:  Negative for pain and visual disturbance.   Respiratory:  Negative for apnea, cough, chest tightness and shortness of breath.    Cardiovascular:  Negative for chest pain and palpitations.   Gastrointestinal:  Negative for abdominal pain, diarrhea, nausea and vomiting.   Genitourinary:  Negative for difficulty urinating and dysuria.   Musculoskeletal:  Negative for joint swelling and myalgias.   Skin:  Negative for color change.   Neurological:  Negative for seizures and headaches.   Psychiatric/Behavioral: Negative.     All other systems reviewed and are negative.       Physical Exam:  /98 (BP Location: Left arm, Patient Position: Sitting)   Pulse 76   Temp 98.2 °F (36.8 °C) (Oral)   Resp 18   Ht 193 cm (76\")   Wt (!) 150 kg (331 lb 9.2 oz)   SpO2 92% Comment: pt denies any soa. good pleth noted. 02 placed on pt. MD aware.  BMI 40.36 kg/m²     Physical Exam  Vitals and nursing note reviewed.   Constitutional:       General: He is not in acute distress.     Appearance: Normal appearance. He is not toxic-appearing.   HENT:      Head: Normocephalic and atraumatic.      Jaw: There is normal jaw occlusion.   Eyes:      General: Lids are normal.      Extraocular Movements: Extraocular movements intact.      Conjunctiva/sclera: Conjunctivae normal.      Pupils: Pupils are equal, round, and reactive to light.   Cardiovascular:      Rate and " Rhythm: Normal rate and regular rhythm.      Pulses: Normal pulses.      Heart sounds: Normal heart sounds.   Pulmonary:      Effort: Pulmonary effort is normal. No respiratory distress.      Breath sounds: Normal breath sounds. No wheezing or rhonchi.   Abdominal:      General: Abdomen is flat.      Palpations: Abdomen is soft.      Tenderness: There is no abdominal tenderness. There is no guarding or rebound.   Musculoskeletal:      Cervical back: Normal range of motion and neck supple.      Right lower leg: No edema.      Left lower leg: No edema.      Comments: Limited range of motion of the neck due to discomfort.  Tenderness to palpation of bilateral trapezius muscles, right worse than left   Skin:     General: Skin is warm and dry.   Neurological:      Mental Status: He is alert and oriented to person, place, and time. Mental status is at baseline.   Psychiatric:         Mood and Affect: Mood normal.                  Procedures:  Procedures      Medical Decision Making:      Comorbidities that affect care:    Diabetes, chronic pain    External Notes reviewed:    Previous Clinic Note: Neurosurgery office visit for herniated cervical disks managed      The following orders were placed and all results were independently analyzed by me:  Orders Placed This Encounter   Procedures    CT Cervical Spine Without Contrast       Medications Given in the Emergency Department:  Medications   HYDROmorphone (DILAUDID) injection 1 mg (1 mg Intravenous Given 8/15/24 0819)   methocarbamol (ROBAXIN) tablet 1,500 mg (1,500 mg Oral Given 8/15/24 0820)        ED Course:         Labs:    Lab Results (last 24 hours)       ** No results found for the last 24 hours. **             Imaging:    CT Cervical Spine Without Contrast    Result Date: 8/15/2024  CT CERVICAL SPINE WO CONTRAST Date of Exam: 8/15/2024 7:55 AM EDT Indication: neck pain. Comparison: Outside MR cervical spine 7/6/2024 Technique: Axial CT images were obtained of the  cervical spine without contrast administration.  Reconstructed coronal and sagittal images were also obtained. Automated exposure control and iterative construction methods were used. Findings: Similar straightening and minimal smooth reversal of the normal cervical lordosis. No traumatic subluxation or significant spondylolisthesis. No acute fracture. Vertebral body heights are normal. There is mild to moderate degenerative disc disease in the  mid and lower cervical spine. There is mild to moderate right greater than left facet arthropathy. The paravertebral soft tissues appear unremarkable. No pharyngeal or laryngeal mass lesion. No cervical adenopathy. Lung apices are clear.     Impression: No acute fracture or traumatic malalignment. Electronically Signed: Jacques Moreira MD  8/15/2024 8:12 AM EDT  Workstation ID: [id number]       Differential Diagnosis and Discussion:    Neck Pain: The patient presents with neck pain. My differential diagnosis includes but is not limited to acute spinal epidural abscess, acute spinal epidural bleed, meningitis, musculoskeletal neck pain, spinal fracture, and osteoarthritis.     CT scan radiology impression was interpreted by me.    MDM  Number of Diagnoses or Management Options  Cervical radiculopathy  Diabetic ulcer of toe of right foot associated with type 2 diabetes mellitus, unspecified ulcer stage  Muscle spasm  Diagnosis management comments: In summary this is a 54-year-old male patient with a history of chronic neck pain who presents to the emergency department for evaluation of new/worsened pain.  CT of the cervical spine reviewed by me is unremarkable for acute pathology.  Patient was given pain control in the emergency department with improvement of symptoms.  He will be discharged home with prescription steroid pack and muscle relaxers.  No new pain medication given as he is in pain management.  Very strict return to ER and follow-up instructions have been provided to  the patient.                   Patient Care Considerations:    LABS: I considered ordering labs, however no signs of metabolic derangement      Consultants/Shared Management Plan:    None    Social Determinants of Health:    Patient is independent, reliable, and has access to care.       Disposition and Care Coordination:    Discharged: The patient is suitable and stable for discharge with no need for consideration of admission.    I have explained the patient´s condition, diagnoses and treatment plan based on the information available to me at this time. I have answered questions and addressed any concerns. The patient has a good  understanding of the patient´s diagnosis, condition, and treatment plan as can be expected at this point. The vital signs have been stable. The patient´s condition is stable and appropriate for discharge from the emergency department.      The patient will pursue further outpatient evaluation with the primary care physician or other designated or consulting physician as outlined in the discharge instructions. They are agreeable to this plan of care and follow-up instructions have been explained in detail. The patient has received these instructions in written format and has expressed an understanding of the discharge instructions. The patient is aware that any significant change in condition or worsening of symptoms should prompt an immediate return to this or the closest emergency department or call to 911.  I have explained discharge medications and the need for follow up with the patient/caretakers. This was also printed in the discharge instructions. Patient was discharged with the following medications and follow up:      Medication List        New Prescriptions      doxycycline 100 MG capsule  Commonly known as: MONODOX  Take 1 capsule by mouth 2 (Two) Times a Day for 10 days.     methylPREDNISolone 4 MG dose pack  Commonly known as: MEDROL  Take 6 tablets by mouth Daily for 1 day,  THEN 5 tablets Daily for 1 day, THEN 4 tablets Daily for 1 day, THEN 3 tablets Daily for 1 day, THEN 2 tablets Daily for 1 day, THEN 1 tablet Daily for 1 day. Take as directed on package instructions.  Start taking on: August 15, 2024     orphenadrine 100 MG 12 hr tablet  Commonly known as: NORFLEX  Take 1 tablet by mouth 2 (Two) Times a Day.               Where to Get Your Medications        These medications were sent to [facility] - [place], [place] - [address] [facility] - [phone]  - [phone]   [address] [id number]      Phone: [phone]   doxycycline 100 MG capsule  methylPREDNISolone 4 MG dose pack  orphenadrine 100 MG 12 hr tablet      Matthew Allen MD  [address] [place] [address]  [phone]    In 1 week         Final diagnoses:   Cervical radiculopathy   Muscle spasm   Diabetic ulcer of toe of right foot associated with type 2 diabetes mellitus, unspecified ulcer stage        ED Disposition       ED Disposition   Discharge    Condition   Stable    Comment   --               This medical record created using voice recognition software.             Julius Shah MD  08/15/24 0973

## 2025-06-07 ENCOUNTER — APPOINTMENT (OUTPATIENT)
Dept: GENERAL RADIOLOGY | Facility: HOSPITAL | Age: 55
End: 2025-06-07
Payer: MEDICARE

## 2025-06-07 ENCOUNTER — HOSPITAL ENCOUNTER (EMERGENCY)
Facility: HOSPITAL | Age: 55
Discharge: HOME | End: 2025-06-07
Attending: EMERGENCY MEDICINE
Payer: MEDICARE

## 2025-06-07 ENCOUNTER — APPOINTMENT (OUTPATIENT)
Facility: HOSPITAL | Age: 55
End: 2025-06-07
Payer: MEDICARE

## 2025-06-07 VITALS
TEMPERATURE: 98.1 F | SYSTOLIC BLOOD PRESSURE: 152 MMHG | HEIGHT: 76 IN | WEIGHT: 315 LBS | BODY MASS INDEX: 38.36 KG/M2 | DIASTOLIC BLOOD PRESSURE: 95 MMHG | HEART RATE: 63 BPM | OXYGEN SATURATION: 93 % | RESPIRATION RATE: 22 BRPM

## 2025-06-07 DIAGNOSIS — L03.115 CELLULITIS OF RIGHT LEG WITHOUT FOOT: ICD-10-CM

## 2025-06-07 DIAGNOSIS — M79.89 PAIN AND SWELLING OF RIGHT LOWER LEG: Primary | ICD-10-CM

## 2025-06-07 DIAGNOSIS — M19.012 OSTEOARTHRITIS OF LEFT GLENOHUMERAL JOINT: ICD-10-CM

## 2025-06-07 DIAGNOSIS — M79.661 PAIN AND SWELLING OF RIGHT LOWER LEG: Primary | ICD-10-CM

## 2025-06-07 LAB
ALBUMIN SERPL-MCNC: 4.1 G/DL (ref 3.5–5.2)
ALBUMIN/GLOB SERPL: 1.7 G/DL
ALP SERPL-CCNC: 61 U/L (ref 39–117)
ALT SERPL W P-5'-P-CCNC: 27 U/L (ref 1–41)
ANION GAP SERPL CALCULATED.3IONS-SCNC: 11.2 MMOL/L (ref 5–15)
AST SERPL-CCNC: 28 U/L (ref 1–40)
BASOPHILS # BLD AUTO: 0.06 10*3/MM3 (ref 0–0.2)
BASOPHILS NFR BLD AUTO: 1 % (ref 0–1.5)
BILIRUB SERPL-MCNC: 0.2 MG/DL (ref 0–1.2)
BUN SERPL-MCNC: 13.4 MG/DL (ref 6–20)
BUN/CREAT SERPL: 13.7 (ref 7–25)
CALCIUM SPEC-SCNC: 9.3 MG/DL (ref 8.6–10.5)
CHLORIDE SERPL-SCNC: 105 MMOL/L (ref 98–107)
CO2 SERPL-SCNC: 22.8 MMOL/L (ref 22–29)
CREAT SERPL-MCNC: 0.98 MG/DL (ref 0.76–1.27)
DEPRECATED RDW RBC AUTO: 41.2 FL (ref 37–54)
EGFRCR SERPLBLD CKD-EPI 2021: 91.1 ML/MIN/1.73
EOSINOPHIL # BLD AUTO: 0.17 10*3/MM3 (ref 0–0.4)
EOSINOPHIL NFR BLD AUTO: 2.7 % (ref 0.3–6.2)
ERYTHROCYTE [DISTWIDTH] IN BLOOD BY AUTOMATED COUNT: 13.3 % (ref 12.3–15.4)
GLOBULIN UR ELPH-MCNC: 2.4 GM/DL
GLUCOSE SERPL-MCNC: 189 MG/DL (ref 65–99)
HCT VFR BLD AUTO: 40.5 % (ref 37.5–51)
HGB BLD-MCNC: 13.5 G/DL (ref 13–17.7)
HOLD SPECIMEN: NORMAL
HOLD SPECIMEN: NORMAL
IMM GRANULOCYTES # BLD AUTO: 0.02 10*3/MM3 (ref 0–0.05)
IMM GRANULOCYTES NFR BLD AUTO: 0.3 % (ref 0–0.5)
LYMPHOCYTES # BLD AUTO: 2.4 10*3/MM3 (ref 0.7–3.1)
LYMPHOCYTES NFR BLD AUTO: 38.2 % (ref 19.6–45.3)
MCH RBC QN AUTO: 28.5 PG (ref 26.6–33)
MCHC RBC AUTO-ENTMCNC: 33.3 G/DL (ref 31.5–35.7)
MCV RBC AUTO: 85.6 FL (ref 79–97)
MONOCYTES # BLD AUTO: 0.38 10*3/MM3 (ref 0.1–0.9)
MONOCYTES NFR BLD AUTO: 6 % (ref 5–12)
NEUTROPHILS NFR BLD AUTO: 3.26 10*3/MM3 (ref 1.7–7)
NEUTROPHILS NFR BLD AUTO: 51.8 % (ref 42.7–76)
NRBC BLD AUTO-RTO: 0 /100 WBC (ref 0–0.2)
PLATELET # BLD AUTO: 353 10*3/MM3 (ref 140–450)
PMV BLD AUTO: 9.5 FL (ref 6–12)
POTASSIUM SERPL-SCNC: 3.9 MMOL/L (ref 3.5–5.2)
PROT SERPL-MCNC: 6.5 G/DL (ref 6–8.5)
RBC # BLD AUTO: 4.73 10*6/MM3 (ref 4.14–5.8)
SODIUM SERPL-SCNC: 139 MMOL/L (ref 136–145)
WBC NRBC COR # BLD AUTO: 6.29 10*3/MM3 (ref 3.4–10.8)
WHOLE BLOOD HOLD COAG: NORMAL
WHOLE BLOOD HOLD SPECIMEN: NORMAL

## 2025-06-07 PROCEDURE — 85025 COMPLETE CBC W/AUTO DIFF WBC: CPT | Performed by: EMERGENCY MEDICINE

## 2025-06-07 PROCEDURE — 36415 COLL VENOUS BLD VENIPUNCTURE: CPT

## 2025-06-07 PROCEDURE — 80053 COMPREHEN METABOLIC PANEL: CPT | Performed by: EMERGENCY MEDICINE

## 2025-06-07 PROCEDURE — 93971 EXTREMITY STUDY: CPT | Performed by: SURGERY

## 2025-06-07 PROCEDURE — 93971 EXTREMITY STUDY: CPT

## 2025-06-07 PROCEDURE — 73030 X-RAY EXAM OF SHOULDER: CPT

## 2025-06-07 PROCEDURE — 99284 EMERGENCY DEPT VISIT MOD MDM: CPT

## 2025-06-07 RX ORDER — SULFAMETHOXAZOLE AND TRIMETHOPRIM 800; 160 MG/1; MG/1
1 TABLET ORAL 2 TIMES DAILY
Qty: 14 TABLET | Refills: 0 | Status: SHIPPED | OUTPATIENT
Start: 2025-06-07

## 2025-06-07 RX ORDER — CEPHALEXIN 500 MG/1
500 CAPSULE ORAL 4 TIMES DAILY
Qty: 28 CAPSULE | Refills: 0 | Status: SHIPPED | OUTPATIENT
Start: 2025-06-07

## 2025-06-07 NOTE — ED TRIAGE NOTES
Pt comes to the ED tonight for swelling, pain and redness in the right lower leg for about 2 weeks now. Pt states that he has a hx of DVT. Pt states that he is also expressing pain in his left shoulder blade.

## 2025-06-08 LAB
BH CV LOW VAS RIGHT GREATER SAPH AK VESSEL: 1
BH CV LOWER VASCULAR RIGHT COMMON FEMORAL AUGMENT: NORMAL
BH CV LOWER VASCULAR RIGHT COMMON FEMORAL COMPETENT: NORMAL
BH CV LOWER VASCULAR RIGHT COMMON FEMORAL COMPRESS: NORMAL
BH CV LOWER VASCULAR RIGHT COMMON FEMORAL PHASIC: NORMAL
BH CV LOWER VASCULAR RIGHT COMMON FEMORAL SPONT: NORMAL
BH CV LOWER VASCULAR RIGHT DISTAL FEMORAL COMPRESS: NORMAL
BH CV LOWER VASCULAR RIGHT GASTRONEMIUS COMPRESS: NORMAL
BH CV LOWER VASCULAR RIGHT GREATER SAPH BK COMPRESS: NORMAL
BH CV LOWER VASCULAR RIGHT LESSER SAPH COMPRESS: NORMAL
BH CV LOWER VASCULAR RIGHT MID FEMORAL AUGMENT: NORMAL
BH CV LOWER VASCULAR RIGHT MID FEMORAL COMPETENT: NORMAL
BH CV LOWER VASCULAR RIGHT MID FEMORAL COMPRESS: NORMAL
BH CV LOWER VASCULAR RIGHT MID FEMORAL PHASIC: NORMAL
BH CV LOWER VASCULAR RIGHT MID FEMORAL SPONT: NORMAL
BH CV LOWER VASCULAR RIGHT PERONEAL COMPRESS: NORMAL
BH CV LOWER VASCULAR RIGHT POPLITEAL AUGMENT: NORMAL
BH CV LOWER VASCULAR RIGHT POPLITEAL COMPETENT: NORMAL
BH CV LOWER VASCULAR RIGHT POPLITEAL COMPRESS: NORMAL
BH CV LOWER VASCULAR RIGHT POPLITEAL PHASIC: NORMAL
BH CV LOWER VASCULAR RIGHT POPLITEAL SPONT: NORMAL
BH CV LOWER VASCULAR RIGHT POSTERIOR TIBIAL COMPRESS: NORMAL
BH CV LOWER VASCULAR RIGHT PROXIMAL FEMORAL COMPRESS: NORMAL
BH CV LOWER VASCULAR RIGHT SAPHENOFEMORAL JUNCTION COMPRESS: NORMAL
BH CV VAS PRELIMINARY FINDINGS SCRIPTING: 1

## 2025-06-08 NOTE — ED PROVIDER NOTES
Time: 8:36 PM EDT  Date of encounter:  6/7/2025  Independent Historian/Clinical History and Information was obtained by:   Patient    History is limited by: N/A    Chief Complaint   Patient presents with    Leg Swelling         History of Present Illness:  Patient is a 55 y.o. year old male who presents to the emergency department for evaluation of right lower leg swelling, calf pain and erythema that first started 2 weeks ago and is worsening. Pt reported he had DVT in same leg in the past. Also c/o intermittent pain in L shoulder area, worse with movement, denies injury.    Patient Care Team  Primary Care Provider: Matthew Allen MD    Past Medical History:     Allergies   Allergen Reactions    Morphine Shortness Of Breath    Penicillins Hives     Past Medical History:   Diagnosis Date    Colon polyp     Diabetes mellitus     TYPE II    Hypertension     Sleep apnea      Past Surgical History:   Procedure Laterality Date    APPENDECTOMY      BACK SURGERY      COLONOSCOPY  07/2020    COLONOSCOPY N/A 3/26/2024    Procedure: COLONOSCOPY WITH BIOPSIES;  Surgeon: Jesus Silvestre MD;  Location: [facility] ENDOSCOPY;  Service: Gastroenterology;  Laterality: N/A;  DIVERTICULOSIS, HEMORRHOIDS, RECTAL POLYP    KNEE ARTHROSCOPY W/ MENISCECTOMY Right     SIGMOIDOSCOPY N/A 6/28/2024    Procedure: SIGMOIDOSCOPY FLEXIBLE;  Surgeon: Jesus Silvestre MD;  Location: [facility] ENDOSCOPY;  Service: Gastroenterology;  Laterality: N/A;  DIVERTICULOSIS, HEMORRHOIDS    VASECTOMY       Family History   Problem Relation Age of Onset    Diabetes Mother     Hypertension Mother     Hyperlipidemia Mother     Diabetes Father     Hypertension Father     Hyperlipidemia Father     Diabetes Brother     Stroke Brother     Hypertension Brother     Hyperlipidemia Brother     Cancer Other     Heart disease Other     Colon cancer Neg Hx        Home Medications:  Prior to Admission medications    Medication Sig Start Date End Date Taking?  Authorizing Provider   aspirin 81 MG EC tablet Take 1 tablet by mouth Daily.    Sridevi Cummins MD   atorvastatin (LIPITOR) 20 MG tablet Take 1 tablet by mouth Daily.    Sridevi Cummins MD   cholecalciferol (VITAMIN D3) 1.25 MG (81463 UT) capsule Take 1 capsule by mouth Every 7 (Seven) Days.    Sridevi Cummins MD   dicyclomine (BENTYL) 20 MG tablet Take 1 tablet by mouth Every 8 (Eight) Hours As Needed (Abdominal crampy pains). 7/27/22   Almas Cosme MD   dutasteride (AVODART) 0.5 MG capsule  8/17/23   Sridevi Cummins MD   fenofibrate micronized (LOFIBRA) 134 MG capsule Take 1 capsule by mouth Every Morning Before Breakfast. 6/24/24   Sridevi Cummins MD   fluticasone (FLONASE) 50 MCG/ACT nasal spray  5/22/23   Sridevi Cummins MD   hydroCHLOROthiazide (HYDRODIURIL) 12.5 MG tablet Take 1 tablet by mouth Daily.    Sridevi Cummins MD   HYDROcodone-acetaminophen (NORCO) 7.5-325 MG per tablet Take 1 tablet by mouth Every 4 (Four) Hours As Needed for Moderate Pain or Severe Pain. 8/7/24   Sridevi Cummins MD   levocetirizine (XYZAL) 5 MG tablet  8/17/23   Sridevi Cummins MD   lisinopril (PRINIVIL,ZESTRIL) 20 MG tablet Take 1 tablet by mouth Daily.    Sridevi Cummins MD   metFORMIN (GLUCOPHAGE) 500 MG tablet Take 1 tablet by mouth 2 (two) times a day.    Sridevi Cummins MD   methylcellulose, Laxative, (CITRUCEL) 500 MG tablet tablet Take 2 tablets by mouth Every 4 (Four) Hours As Needed.    Sridevi Cummins MD   naproxen (NAPROSYN) 500 MG tablet  8/2/22   Sridevi Cummins MD   nebivolol (BYSTOLIC) 10 MG tablet 2 tablets. 8/17/23   Sridevi Cummins MD   Omega 3-6-9 Fatty Acids (OMEGA 3-6-9 PO) Take  by mouth.    Sridevi Cummins MD   ondansetron ODT (ZOFRAN-ODT) 4 MG disintegrating tablet Place 1 tablet on the tongue Every 6 (Six) Hours As Needed for Nausea or Vomiting. 7/27/22   Almas Cosme MD   orphenadrine (NORFLEX) 100 MG 12 hr  "tablet Take 1 tablet by mouth 2 (Two) Times a Day. 8/15/24   Julius Shah MD   pregabalin (LYRICA) 75 MG capsule  10/7/22   ProviderSridevi MD   tamsulosin (FLOMAX) 0.4 MG capsule 24 hr capsule Take 1 capsule by mouth Daily.    Provider, MD Sridevi        Social History:   Social History     Tobacco Use    Smoking status: Never    Smokeless tobacco: Never   Vaping Use    Vaping status: Never Used   Substance Use Topics    Alcohol use: Never    Drug use: Never         Review of Systems:  Review of Systems   Constitutional:  Negative for chills and fever.   HENT:  Negative for congestion, ear pain and sore throat.    Eyes:  Negative for pain.   Respiratory:  Negative for cough, chest tightness and shortness of breath.    Cardiovascular:  Positive for leg swelling. Negative for chest pain.   Gastrointestinal:  Negative for abdominal pain, diarrhea, nausea and vomiting.   Genitourinary:  Negative for flank pain and hematuria.   Musculoskeletal:  Negative for joint swelling.   Skin:  Negative for pallor.   Neurological:  Negative for seizures and headaches.   All other systems reviewed and are negative.       Physical Exam:  /95   Pulse 63   Temp 98.4 °F (36.9 °C) (Oral)   Resp 22   Ht 193 cm (76\")   Wt (!) 159 kg (350 lb 1.5 oz)   SpO2 93%   BMI 42.61 kg/m²         Physical Exam  Vitals and nursing note reviewed.   Constitutional:       General: He is not in acute distress.     Appearance: Normal appearance. He is not toxic-appearing.   HENT:      Head: Normocephalic and atraumatic.      Mouth/Throat:      Mouth: Mucous membranes are moist.   Eyes:      General: No scleral icterus.  Cardiovascular:      Rate and Rhythm: Normal rate and regular rhythm.      Pulses: Normal pulses.      Heart sounds: Normal heart sounds.   Pulmonary:      Effort: Pulmonary effort is normal. No respiratory distress.      Breath sounds: Normal breath sounds.   Abdominal:      General: Abdomen is flat.      " Palpations: Abdomen is soft.      Tenderness: There is no abdominal tenderness.   Musculoskeletal:         General: Normal range of motion.      Cervical back: Normal range of motion and neck supple.      Right lower leg: Swelling and tenderness present.   Skin:     General: Skin is warm and dry.   Neurological:      Mental Status: He is alert and oriented to person, place, and time. Mental status is at baseline.                            Medical Decision Making:      Comorbidities that affect care:    Diabetes, Hypertension    External Notes reviewed:    None      The following orders were placed and all results were independently analyzed by me:  Orders Placed This Encounter   Procedures    XR Shoulder 2+ View Left    Comprehensive Metabolic Panel    [place] Draw    CBC Auto Differential    CBC & Differential    Green Top (Gel)    Lavender Top    Gold Top - SST    Light Blue Top       Medications Given in the Emergency Department:  Medications - No data to display     ED Course:    The patient was initially evaluated in the triage area where orders were placed. The patient was later dispositioned by JOSIAS Diaz.      The patient was advised to stay for completion of workup which includes but is not limited to communication of labs and radiological results, reassessment and plan. The patient was advised that leaving prior to disposition by a provider could result in critical findings that are not communicated to the patient.          Labs:    Lab Results (last 24 hours)       Procedure Component Value Units Date/Time    CBC & Differential [[id number]]  (Normal) Collected: 06/07/25 2050    Specimen: Blood Updated: 06/07/25 2056    Narrative:      The following orders were created for panel order CBC & Differential.  Procedure                               Abnormality         Status                     ---------                               -----------         ------                     CBC Auto  Differential[[id number]]        Normal              Final result                 Please view results for these tests on the individual orders.    Comprehensive Metabolic Panel [[id number]]  (Abnormal) Collected: 06/07/25 2050    Specimen: Blood Updated: 06/07/25 2114     Glucose 189 mg/dL      BUN 13.4 mg/dL      Creatinine 0.98 mg/dL      Sodium 139 mmol/L      Potassium 3.9 mmol/L      Comment: Slight hemolysis detected by analyzer. Result may be falsely elevated.        Chloride 105 mmol/L      CO2 22.8 mmol/L      Calcium 9.3 mg/dL      Total Protein 6.5 g/dL      Albumin 4.1 g/dL      ALT (SGPT) 27 U/L      AST (SGOT) 28 U/L      Comment: Slight hemolysis detected by analyzer. Result may be falsely elevated.        Alkaline Phosphatase 61 U/L      Total Bilirubin 0.2 mg/dL      Globulin 2.4 gm/dL      A/G Ratio 1.7 g/dL      BUN/Creatinine Ratio 13.7     Anion Gap 11.2 mmol/L      eGFR 91.1 mL/min/1.73     Narrative:      GFR Categories in Chronic Kidney Disease (CKD)              GFR Category          GFR (mL/min/1.73)    Interpretation  G1                    90 or greater        Normal or high (1)  G2                    60-89                Mild decrease (1)  G3a                   45-59                Mild to moderate decrease  G3b                   30-44                Moderate to severe decrease  G4                    15-29                Severe decrease  G5                    14 or less           Kidney failure    (1)In the absence of evidence of kidney disease, neither GFR category G1 or G2 fulfill the criteria for CKD.    eGFR calculation 2021 CKD-EPI creatinine equation, which does not include race as a factor    CBC Auto Differential [[id number]]  (Normal) Collected: 06/07/25 2050    Specimen: Blood Updated: 06/07/25 2056     WBC 6.29 10*3/mm3      RBC 4.73 10*6/mm3      Hemoglobin 13.5 g/dL      Hematocrit 40.5 %      MCV 85.6 fL      MCH 28.5 pg      MCHC 33.3 g/dL      RDW 13.3 %      RDW-SD 41.2 fl       MPV 9.5 fL      Platelets 353 10*3/mm3      Neutrophil % 51.8 %      Lymphocyte % 38.2 %      Monocyte % 6.0 %      Eosinophil % 2.7 %      Basophil % 1.0 %      Immature Grans % 0.3 %      Neutrophils, Absolute 3.26 10*3/mm3      Lymphocytes, Absolute 2.40 10*3/mm3      Monocytes, Absolute 0.38 10*3/mm3      Eosinophils, Absolute 0.17 10*3/mm3      Basophils, Absolute 0.06 10*3/mm3      Immature Grans, Absolute 0.02 10*3/mm3      nRBC 0.0 /100 WBC              Imaging:    XR Shoulder 2+ View Left  Result Date: 6/7/2025  XR SHOULDER 2+ VW LEFT Date of Exam: 6/7/2025 2:22 AM EDT Indication: pain with ROM Comparison: 8/5/2022 Findings: No fracture or dislocation. No AC joint separation. There is severe glenohumeral arthropathy. There is mild AC joint arthropathy. No bone erosion or destruction.     Severe glenohumeral arthropathy with mild AC joint arthropathy. No acute findings. Electronically Signed: Jamil Ventura MD  6/7/2025 10:38 PM EDT  Workstation ID: [id number]        Differential Diagnosis and Discussion:      Extremity Pain: Differential diagnosis includes but is not limited to soft tissue sprain, tendonitis, tendon injury, dislocation, fracture, deep vein thrombosis, arterial insufficiency, osteoarthritis, bursitis, and ligamentous damage.    PROCEDURES:    Labs were collected in the emergency department and all labs were reviewed and interpreted by me.  Ultrasound was performed in the emergency department and the ultrasound impression was interpreted by me.     No orders to display        Procedures    MDM     Amount and/or Complexity of Data Reviewed  Clinical lab tests: reviewed  Tests in the radiology section of CPT®: reviewed                     Patient Care Considerations:    SEPSIS was considered but is NOT present in the emergency department as SIRS criteria is not present.      Consultants/Shared Management Plan:    SHARED VISIT: I have discussed the case with my supervising physician,   Ryan who statesBactrim DS BID and Keflex for cellulitis. The substantive portion of the medical decision was made by the attesting physician who made or approve the management plan and will take responsibility for the patient.  Clinical findings were discussed and ultimate disposition was made in consult with supervising physician.    Social Determinants of Health:    Patient is independent, reliable, and has access to care.       Disposition and Care Coordination:    Discharged: I considered escalation of care by admitting this patient to the hospital, however pt is afebrile, non toxic in appearance, No SIRS criteria, no streaking of erythema/redness        Final diagnoses:   Pain and swelling of right lower leg   Osteoarthritis of left glenohumeral joint   Cellulitis of right leg without foot        ED Disposition       ED Disposition   Discharge    Condition   Stable    Comment   --               This medical record created using voice recognition software.             Todd Carver, APRN  06/07/25 [id number]

## 2025-06-08 NOTE — ED PROVIDER NOTES
"SHARED VISIT ATTESTATION:    This visit was performed by myself and an APC.  I personally approved the management plan/medical decision making and take responsibility for the patient management.      SHARED VISIT NOTE:    Patient is 55 y.o. year old male that presents to the ED for evaluation of right leg pain redness and swelling, history of DVT.     Physical Exam    ED Course:    /87 (BP Location: Left arm, Patient Position: Sitting)   Pulse 61   Temp 98.4 °F (36.9 °C) (Oral)   Resp 22   Ht 193 cm (76\")   Wt (!) 159 kg (350 lb 1.5 oz)   SpO2 96%   BMI 42.61 kg/m²       The following orders were placed and all results were independently analyzed by me:  Orders Placed This Encounter   Procedures    Comprehensive Metabolic Panel    [place] Draw    CBC Auto Differential    CBC & Differential    Green Top (Gel)    Lavender Top    Gold Top - SST    Light Blue Top       Medications Given in the Emergency Department:  Medications - No data to display     ED Course:         Labs:    Lab Results (last 24 hours)       ** No results found for the last 24 hours. **             Imaging:    No Radiology Exams Resulted Within Past 24 Hours    MDM:    Procedures    Labs were collected in the emergency department and all labs were reviewed and interpreted by me.  Ultrasound was performed in the emergency department and the ultrasound impression was interpreted by me.                      Almas Cosme MD  20:48 EDT  06/07/25         Almas Cosme MD  06/07/25 2044    "

## 2025-07-15 ENCOUNTER — TRANSCRIBE ORDERS (OUTPATIENT)
Dept: ADMINISTRATIVE | Facility: HOSPITAL | Age: 55
End: 2025-07-15
Payer: MEDICARE

## 2025-07-15 DIAGNOSIS — R60.0 LOCALIZED EDEMA: ICD-10-CM

## 2025-07-15 DIAGNOSIS — M79.661 PAIN IN RIGHT LOWER LEG: Primary | ICD-10-CM

## 2025-07-18 ENCOUNTER — HOSPITAL ENCOUNTER (OUTPATIENT)
Dept: CARDIOLOGY | Facility: HOSPITAL | Age: 55
Discharge: HOME | End: 2025-07-18
Payer: MEDICARE

## 2025-07-18 DIAGNOSIS — M79.661 PAIN IN RIGHT LOWER LEG: ICD-10-CM

## 2025-07-18 DIAGNOSIS — R60.0 LOCALIZED EDEMA: ICD-10-CM

## 2025-07-18 LAB
BH CV LOW VAS RIGHT GREATER SAPH AK VESSEL: 1
BH CV LOW VAS RIGHT GREATER SAPH BK VESSEL: 1
BH CV LOWER VASCULAR LEFT COMMON FEMORAL AUGMENT: NORMAL
BH CV LOWER VASCULAR LEFT COMMON FEMORAL COMPETENT: NORMAL
BH CV LOWER VASCULAR LEFT COMMON FEMORAL COMPRESS: NORMAL
BH CV LOWER VASCULAR LEFT COMMON FEMORAL PHASIC: NORMAL
BH CV LOWER VASCULAR LEFT COMMON FEMORAL SPONT: NORMAL
BH CV LOWER VASCULAR RIGHT COMMON FEMORAL AUGMENT: NORMAL
BH CV LOWER VASCULAR RIGHT COMMON FEMORAL COMPETENT: NORMAL
BH CV LOWER VASCULAR RIGHT COMMON FEMORAL COMPRESS: NORMAL
BH CV LOWER VASCULAR RIGHT COMMON FEMORAL PHASIC: NORMAL
BH CV LOWER VASCULAR RIGHT COMMON FEMORAL SPONT: NORMAL
BH CV LOWER VASCULAR RIGHT DISTAL FEMORAL COMPRESS: NORMAL
BH CV LOWER VASCULAR RIGHT GASTRONEMIUS COMPRESS: NORMAL
BH CV LOWER VASCULAR RIGHT LESSER SAPH COMPRESS: NORMAL
BH CV LOWER VASCULAR RIGHT MID FEMORAL AUGMENT: NORMAL
BH CV LOWER VASCULAR RIGHT MID FEMORAL COMPETENT: NORMAL
BH CV LOWER VASCULAR RIGHT MID FEMORAL COMPRESS: NORMAL
BH CV LOWER VASCULAR RIGHT MID FEMORAL PHASIC: NORMAL
BH CV LOWER VASCULAR RIGHT MID FEMORAL SPONT: NORMAL
BH CV LOWER VASCULAR RIGHT PERONEAL COMPRESS: NORMAL
BH CV LOWER VASCULAR RIGHT POPLITEAL AUGMENT: NORMAL
BH CV LOWER VASCULAR RIGHT POPLITEAL COMPETENT: NORMAL
BH CV LOWER VASCULAR RIGHT POPLITEAL COMPRESS: NORMAL
BH CV LOWER VASCULAR RIGHT POPLITEAL PHASIC: NORMAL
BH CV LOWER VASCULAR RIGHT POPLITEAL SPONT: NORMAL
BH CV LOWER VASCULAR RIGHT POSTERIOR TIBIAL COMPRESS: NORMAL
BH CV LOWER VASCULAR RIGHT PROXIMAL FEMORAL COMPRESS: NORMAL
BH CV LOWER VASCULAR RIGHT SAPHENOFEMORAL JUNCTION COMPRESS: NORMAL

## 2025-07-18 PROCEDURE — 93971 EXTREMITY STUDY: CPT

## 2025-07-23 LAB
BH CV LOW VAS RIGHT GREATER SAPH AK VESSEL: 1
BH CV LOW VAS RIGHT GREATER SAPH BK VESSEL: 1
BH CV LOWER VASCULAR LEFT COMMON FEMORAL AUGMENT: NORMAL
BH CV LOWER VASCULAR LEFT COMMON FEMORAL COMPETENT: NORMAL
BH CV LOWER VASCULAR LEFT COMMON FEMORAL COMPRESS: NORMAL
BH CV LOWER VASCULAR LEFT COMMON FEMORAL PHASIC: NORMAL
BH CV LOWER VASCULAR LEFT COMMON FEMORAL SPONT: NORMAL
BH CV LOWER VASCULAR RIGHT ANTERIOR SAPH COMPRESS: NORMAL
BH CV LOWER VASCULAR RIGHT COMMON FEMORAL AUGMENT: NORMAL
BH CV LOWER VASCULAR RIGHT COMMON FEMORAL COMPETENT: NORMAL
BH CV LOWER VASCULAR RIGHT COMMON FEMORAL COMPRESS: NORMAL
BH CV LOWER VASCULAR RIGHT COMMON FEMORAL PHASIC: NORMAL
BH CV LOWER VASCULAR RIGHT COMMON FEMORAL SPONT: NORMAL
BH CV LOWER VASCULAR RIGHT DISTAL FEMORAL COMPRESS: NORMAL
BH CV LOWER VASCULAR RIGHT GASTRONEMIUS COMPRESS: NORMAL
BH CV LOWER VASCULAR RIGHT GREATER SAPH AK COMPRESS: NORMAL
BH CV LOWER VASCULAR RIGHT GREATER SAPH BK COMPRESS: NORMAL
BH CV LOWER VASCULAR RIGHT LESSER SAPH COMPRESS: NORMAL
BH CV LOWER VASCULAR RIGHT MID FEMORAL AUGMENT: NORMAL
BH CV LOWER VASCULAR RIGHT MID FEMORAL COMPETENT: NORMAL
BH CV LOWER VASCULAR RIGHT MID FEMORAL COMPRESS: NORMAL
BH CV LOWER VASCULAR RIGHT MID FEMORAL PHASIC: NORMAL
BH CV LOWER VASCULAR RIGHT MID FEMORAL SPONT: NORMAL
BH CV LOWER VASCULAR RIGHT PERONEAL COMPRESS: NORMAL
BH CV LOWER VASCULAR RIGHT POPLITEAL AUGMENT: NORMAL
BH CV LOWER VASCULAR RIGHT POPLITEAL COMPETENT: NORMAL
BH CV LOWER VASCULAR RIGHT POPLITEAL COMPRESS: NORMAL
BH CV LOWER VASCULAR RIGHT POPLITEAL PHASIC: NORMAL
BH CV LOWER VASCULAR RIGHT POPLITEAL SPONT: NORMAL
BH CV LOWER VASCULAR RIGHT POSTERIOR TIBIAL COMPRESS: NORMAL
BH CV LOWER VASCULAR RIGHT PROXIMAL FEMORAL COMPRESS: NORMAL
BH CV LOWER VASCULAR RIGHT SAPHENOFEMORAL JUNCTION COMPRESS: NORMAL

## (undated) DEVICE — SOLIDIFIER LIQLOC PLS 1500CC BT

## (undated) DEVICE — Device

## (undated) DEVICE — SINGLE-USE BIOPSY FORCEPS: Brand: RADIAL JAW 4

## (undated) DEVICE — SOL IRRG H2O PL/BG 1000ML STRL

## (undated) DEVICE — Device: Brand: DEFENDO AIR/WATER/SUCTION AND BIOPSY VALVE

## (undated) DEVICE — CONN JET HYDRA H20 AUXILIARY DISP

## (undated) DEVICE — LINER SURG CANSTR SXN S/RIGD 1500CC